# Patient Record
Sex: MALE | Race: OTHER | Employment: OTHER | URBAN - METROPOLITAN AREA
[De-identification: names, ages, dates, MRNs, and addresses within clinical notes are randomized per-mention and may not be internally consistent; named-entity substitution may affect disease eponyms.]

---

## 2017-09-19 ENCOUNTER — OFFICE VISIT (OUTPATIENT)
Dept: ORTHOPEDIC SURGERY | Age: 82
End: 2017-09-19

## 2017-09-19 VITALS
RESPIRATION RATE: 16 BRPM | TEMPERATURE: 97.8 F | WEIGHT: 171.8 LBS | DIASTOLIC BLOOD PRESSURE: 47 MMHG | BODY MASS INDEX: 26.04 KG/M2 | SYSTOLIC BLOOD PRESSURE: 121 MMHG | HEART RATE: 81 BPM | OXYGEN SATURATION: 96 % | HEIGHT: 68 IN

## 2017-09-19 DIAGNOSIS — M79.2 NEURITIS: ICD-10-CM

## 2017-09-19 DIAGNOSIS — M47.816 LUMBAR FACET ARTHROPATHY: ICD-10-CM

## 2017-09-19 DIAGNOSIS — M54.50 LUMBAR SPINE PAIN: Primary | ICD-10-CM

## 2017-09-19 DIAGNOSIS — M51.36 DDD (DEGENERATIVE DISC DISEASE), LUMBAR: ICD-10-CM

## 2017-09-19 DIAGNOSIS — M62.830 MUSCLE SPASM OF BACK: ICD-10-CM

## 2017-09-19 NOTE — PATIENT INSTRUCTIONS
Low Back Arthritis: Exercises  Your Care Instructions  Here are some examples of typical rehabilitation exercises for your condition. Start each exercise slowly. Ease off the exercise if you start to have pain. Your doctor or physical therapist will tell you when you can start these exercises and which ones will work best for you. When you are not being active, find a comfortable position for rest. Some people are comfortable on the floor or a medium-firm bed with a small pillow under their head and another under their knees. Some people prefer to lie on their side with a pillow between their knees. Don't stay in one position for too long. Take short walks (10 to 20 minutes) every 2 to 3 hours. Avoid slopes, hills, and stairs until you feel better. Walk only distances you can manage without pain, especially leg pain. How to do the exercises  Pelvic tilt    1. Lie on your back with your knees bent. 2. \"Brace\" your stomach--tighten your muscles by pulling in and imagining your belly button moving toward your spine. 3. Press your lower back into the floor. You should feel your hips and pelvis rock back. 4. Hold for 6 seconds while breathing smoothly. 5. Relax and allow your pelvis and hips to rock forward. 6. Repeat 8 to 12 times. Back stretches    1. Get down on your hands and knees on the floor. 2. Relax your head and allow it to droop. Round your back up toward the ceiling until you feel a nice stretch in your upper, middle, and lower back. Hold this stretch for as long as it feels comfortable, or about 15 to 30 seconds. 3. Return to the starting position with a flat back while you are on your hands and knees. 4. Let your back sway by pressing your stomach toward the floor. Lift your buttocks toward the ceiling. 5. Hold this position for 15 to 30 seconds. 6. Repeat 2 to 4 times. Follow-up care is a key part of your treatment and safety.  Be sure to make and go to all appointments, and call your doctor if you are having problems. It's also a good idea to know your test results and keep a list of the medicines you take. Where can you learn more? Go to http://kimberli-tara.info/. Enter S015 in the search box to learn more about \"Low Back Arthritis: Exercises. \"  Current as of: March 21, 2017  Content Version: 11.3  © 0418-4290 [employer]. Care instructions adapted under license by [employer] (which disclaims liability or warranty for this information). If you have questions about a medical condition or this instruction, always ask your healthcare professional. [place] [address] any warranty or liability for your use of this information. Learning About Medial Branch Block and Neurotomy  What are medial branch block and neurotomy? Facet joints connect your vertebrae to each other. Problems in these joints can cause chronic (long-term) pain in the neck or back. They can sometimes affect the shoulders, arms, buttocks, or legs. Medial branch nerves are the nerves that carry many of the pain messages from your facet joints. Radiofrequency medial branch neurotomy is a type of medial branch neurotomy that is used to relieve arthritis pain. It uses radio waves to damage nerves in your neck or back so that they can no longer send pain messages to your brain. Before your doctor knows if a neurotomy will help you, he or she will do a medial branch block to find out if certain nerves are the ones that are a source of your pain. You will need two separate visits to the outpatient center or hospital to have both procedures. How is a medial branch block done? The doctor will use a tiny needle to numb the skin where you will get the block. Then he or she puts the block needle into the numbed area. You may feel some pressure, but you should not feel pain.  Using fluoroscopy (live X-ray) to guide the needle, the doctor injects medicine onto one or more nerves to make them numb. If you get relief from your pain in the next 4 to 6 hours, it's a sign that those nerves may be contributing to your pain. The relief will last only a short time. You may then have a medial branch neurotomy at a later visit to try to get longer relief. It takes 20 to 30 minutes to get the block. You can go home after the doctor watches you for about an hour. You will get instructions on how to report how much pain you have when you are at home. You will need someone to drive you home. How is medial branch neurotomy done? The doctor will use a tiny needle to numb the skin where you will get the neurotomy. Then he or she puts the neurotomy needle into the numbed area. You may feel some pressure. Using fluoroscopy (live X-ray) to guide the needle, the doctor sends radio waves through the needle to the nerve for 60 to 90 seconds. The radio waves heat the nerve, which damages it. The doctor may do this several times. And he or she may treat more than one nerve. It takes 45 to 90 minutes to get a neurotomy, depending on how many nerves are heated. You will probably go home 30 to 60 minutes later. You will need someone to drive you home. What can you expect after a neurotomy? You may feel a little sore or tender at the injection site at first. But after a successful neurotomy, most people have pain relief right away. It often lasts for 9 to 12 months or longer. Sometimes the pain relief is permanent. If your pain does come back, it may mean that the damaged nerve has healed and can send pain messages again. Or it can mean that a different nerve is causing pain. Your doctor will discuss your options with you. Follow-up care is a key part of your treatment and safety. Be sure to make and go to all appointments, and call your doctor if you are having problems. It's also a good idea to know your test results and keep a list of the medicines you take. Where can you learn more?   Go to http://tami.info/. Enter Q250 in the search box to learn more about \"Learning About Medial Branch Block and Neurotomy. \"  Current as of: October 14, 2016  Content Version: 11.3  © 9217-9823 [employer]. Care instructions adapted under license by [employer] (which disclaims liability or warranty for this information). If you have questions about a medical condition or this instruction, always ask your healthcare professional. [address] any warranty or liability for your use of this information.

## 2017-09-19 NOTE — PROGRESS NOTES
[facility]  [address], [facility] [address]  Phone: [phone]  Fax: [phone]    NEW PATIENT    ASSESSMENT   Diagnoses and all orders for this visit:    1. Lumbar spine pain  -     [79982] LS Spine 4V  -     MRI LUMB SPINE W WO CONT; Future    2. Lumbar facet arthropathy  -     MRI LUMB SPINE W WO CONT; Future  -     REFERRAL TO PAIN MANAGEMENT    3. Muscle spasm of back  -     MRI LUMB SPINE W WO CONT; Future  -     REFERRAL TO PAIN MANAGEMENT    4. DDD (degenerative disc disease), lumbar  -     MRI LUMB SPINE W WO CONT; Future    5. Neuritis  -     MRI LUMB SPINE W WO CONT; Future       IMPRESSION AND PLAN:  Meño Schuler is a 81 y.o. male with history of lumbar pain with left radicular symptoms. Pt c/o intermittent pain in the left hip/buttock that extends down the left leg. He has tried steroid injections in the past with temporary relief. 1) Pt was given information on lumbar arthritis exercises. 2) I encouraged the patient to exercise regularly, 30 minutes a day, 5 days a week. 3) Discussed treatment options with the Pt, including steroid injection and a lumbar RFA. 4) Pt was given information on radiofrequency ablation. 5) An open lumbar MRI was ordered. 6) I recommended the Pt try andrea chi, water exercise, and chair yoga. 7) Mr. Alfonso Tamayo has a reminder for a \"due or due soon\" health maintenance. I have asked that he contact his primary care provider, Pushpa Mcclure DO, for follow-up on this health maintenance. 8)  demonstrated consistency with prescribing. 9) Pt will follow-up with Dr. Griselda Akins. HISTORY OF PRESENT ILLNESS:  Meño Schuler is a 81 y.o. male with history of lumbar pain with left radicular symptoms. Pt c/o intermittent pain in the left hip/buttock that extends down the left leg.  His pain is worse when bending/ working in the yard and better with rest. Pt reports that he experienced severe left lower back pain about 1 month ago and was unable to get out of bed that day. He states that his pain gradually improved and denies experience any pain radiating down the left leg at that time. Pt admits that he experienced left side sciatica in the past that improved for years but reports that the pain has recently returned. Of note, Pt reports a prior lumbar surgery at L4-5. He has tried a L4-5 facet injection in the past with temporary relief. Pt at this time desires to proceed with a lumbar MRI and a referral to Dr. Tamie Hernandez for lumbar RFA evaluation. Of note, the patient reports a prior right knee replacement. He likes to walk for 30 minutes on the treadmill and then uses the stationary bicycle for 30 minutes at the [facility] 3-5 days a week. Pt denies any recent falls but admits that he occasionally does not feel as stable on his feet. Pain Scale: 8/10     PCP: Autry Mcardle, DO      Past Medical History:   Diagnosis Date    CAD (coronary artery disease)     5 stents    Chronic back pain     Diabetes ([facility] [address]     Hypercholesterolemia     Hypertension         Social History     Social History    Marital status:      Spouse name: N/A    Number of children: N/A    Years of education: N/A     Occupational History    Not on file. Social History Main Topics    Smoking status: Not on file    Smokeless tobacco: Not on file    Alcohol use Yes      Comment: occasionally    Drug use: Not on file    Sexual activity: Not on file     Other Topics Concern    Not on file     Social History Narrative       Current Outpatient Prescriptions   Medication Sig Dispense Refill    PITAVASTATIN CALCIUM (LIVALO PO) Take  by mouth.  RANITIDINE HCL PO Take  by mouth.  GABAPENTIN (NEURONTIN PO) Take  by mouth.  omega-3 fatty acids-vitamin e (FISH OIL) 1,000 mg cap Take 1 Cap by mouth.  Cholecalciferol, Vitamin D3, (VITAMIN D3) 1,000 unit cap Take  by mouth.       METFORMIN HCL (METFORMIN PO) Take by mouth.  LISINOPRIL PO Take  by mouth.  CETIRIZINE HCL (CETIRIZINE PO) Take  by mouth.  CLOPIDOGREL BISULFATE (PLAVIX PO) Take  by mouth.  ATENOLOL PO Take  by mouth.  ACETAMINOPHEN WITH CODEINE (TYLENOL-CODEINE #3 PO) Take  by mouth.  ROSUVASTATIN CALCIUM (CRESTOR PO) Take  by mouth. Allergies   Allergen Reactions    Percocet [Oxycodone-Acetaminophen] Unknown (comments)    Vicodin [Hydrocodone-Acetaminophen] Other (comments)     hallucinates       REVIEW OF SYSTEMS    Constitutional: Negative for fever, chills, or weight change. Respiratory: Negative for cough or shortness of breath. Cardiovascular: Negative for chest pain or palpitations. Gastrointestinal: Negative for acid reflux, change in bowel habits, or constipation. Genitourinary: Negative for dysuria and flank pain. Musculoskeletal: Positive for lumbar pain. Skin: Negative for rash. Neurological: Negative for headaches, dizziness, or numbness. Endo/Heme/Allergies: Negative for increased bruising. Psychiatric/Behavioral: Negative for difficulty with sleep. PHYSICAL EXAMINATION  Visit Vitals    /47    Pulse 81    Temp 97.8 °F (36.6 °C) (Oral)    Resp 16    Ht 5' 7.5\" (1.715 m)    Wt 171 lb 12.8 oz (77.9 kg)    SpO2 96%    BMI 26.51 kg/m2       Constitutional: Awake, alert, and in no acute distress  HEENT: Normocephalic. Atraumatic. Oropharynx is moist and clear. PERRL. EOMI. Sclerae are nonicteric  Heart: Regular rate and rhythm  Lungs: Clear to auscultation bilaterally  Abdomen: Soft and nontender. Bowel sounds are present  Neurological: 1+ symmetrical DTRs in the upper extremities. 1+ symmetrical DTRs in the lower extremities. Sensation to light touch is intact. Negative Sarita's sign bilaterally. Skin: warm, dry, and intact. Musculoskeletal: Good ROM in the cervical spine in all planes. Minimal tightness across the upper trapezius.  Tenderness to palpation in the lower lumbar region. Moderate pain with extension, axial loading, and forward flexion. Very limited internal rotation of the left hip without pain. Normal ROM with internal or external rotation of his right hip. Negative straight leg raise bilaterally. Biceps  Triceps Deltoids Wrist Ext Wrist Flex Hand Intrin   Right +4/5 +4/5 +4/5 +4/5 +4/5 +4/5   Left +4/5 +4/5 +4/5 +4/5 +4/5 +4/5      Hip Flex  Quads Hamstrings Ankle DF EHL Ankle PF   Right +4/5 +4/5 +4/5 +4/5 +4/5 +4/5   Left +4/5 +4/5 +4/5 +4/5 +4/5 +4/5     IMAGING:    Lumbar spine 4V X-rays from 09/19/2017 were personally reviewed with the patient and demonstrated:  1) Scoliosis. 2) Multilevel bridging osteophytes with a large L1-2 bridging osteophyte on the right. 3) Diffuse degenerative changes. 4) Calcification in the aorta. 5) Degenerative joint findings in both hips. Written by Marina Culver, as dictated by Edel Craven MD.  I, Dr. Edel Craven confirm that all documentation is accurate.

## 2017-09-19 NOTE — MR AVS SNAPSHOT
Visit Information Date & Time Provider Department Dept. Phone Encounter #  
 9/19/2017  9:00 AM Los Moss, [address] [facility]  Follow-up Instructions Return if symptoms worsen or fail to improve, for Diagnostic Test follow up. Upcoming Health Maintenance Date Due DTaP/Tdap/Td series (1 - Tdap) 1/23/1957 ZOSTER VACCINE AGE 60> 11/23/1995 GLAUCOMA SCREENING Q2Y 1/23/2001 Pneumococcal 65+ Low/Medium Risk (1 of 2 - PCV13) 1/23/2001 MEDICARE YEARLY EXAM 1/23/2001 INFLUENZA AGE 9 TO ADULT 8/1/2017 Allergies as of 9/19/2017  Review Complete On: 9/19/2017 By: Los Moss MD  
  
 Severity Noted Reaction Type Reactions Percocet [Oxycodone-acetaminophen]  07/29/2013    Unknown (comments) Vicodin [Hydrocodone-acetaminophen]   Intolerance Other (comments)  
 hallucinates Current Immunizations  Never Reviewed No immunizations on file. Not reviewed this visit You Were Diagnosed With   
  
 Codes Comments Lumbar spine pain    -  Primary ICD-10-CM: M54.5 ICD-9-CM: 724.2 Lumbar facet arthropathy     ICD-10-CM: M12.88 ICD-9-CM: 721.3 Muscle spasm of back     ICD-10-CM: M51.665 ICD-9-CM: 724.8   
 DDD (degenerative disc disease), lumbar     ICD-10-CM: M51.36 
ICD-9-CM: 722.52 Neuritis     ICD-10-CM: M79.2 ICD-9-CM: 729.2 Vitals BP Pulse Temp Resp Height(growth percentile) Weight(growth percentile) 121/47 81 97.8 °F (36.6 °C) (Oral) 16 5' 7.5\" (1.715 m) 171 lb 12.8 oz (77.9 kg) SpO2 BMI Smoking Status 96% 26.51 kg/m2 Never Assessed BMI and BSA Data Body Mass Index Body Surface Area  
 26.51 kg/m 2 1.93 m 2 Preferred Pharmacy Pharmacy Name Phone [facility] [address], [address] [facility] Your Updated Medication List  
  
   
 This list is accurate as of: 9/19/17 10:22 AM.  Always use your most recent med list.  
  
  
  
  
 ATENOLOL PO Take  by mouth. CETIRIZINE PO Take  by mouth. CRESTOR PO Take  by mouth. FISH OIL 1,000 mg Cap Generic drug:  omega-3 fatty acids-vitamin e Take 1 Cap by mouth. LISINOPRIL PO Take  by mouth. LIVALO PO Take  by mouth. METFORMIN PO Take  by mouth. NEURONTIN PO Take  by mouth. PLAVIX PO Take  by mouth. RANITIDINE HCL PO Take  by mouth. TYLENOL-CODEINE #3 PO Take  by mouth. VITAMIN D3 1,000 unit Cap Generic drug:  cholecalciferol Take  by mouth. We Performed the Following AMB POC XRAY, SPINE, LUMBOSACRAL; 4+ [id number] CPT(R)] REFERRAL TO PAIN MANAGEMENT [VVB882 Custom] Comments:  
 Refer for RFA Lumbar Follow-up Instructions Return if symptoms worsen or fail to improve, for Diagnostic Test follow up. To-Do List   
 09/25/2017 5:00 PM  
  Appointment with [facility] RM 2 at [address] [place] [address] ([phone]) GENERAL INSTRUCTIONS  Bring information (ID card) if you have any medically implanted devices. You will be required to lie still while the procedure is being performed. Remove any jewelry (including body piercing, hairpins) prior to MRI. If you have had a creatinine level drawn within the past 30 days, please bring most recent results to your appt. Bring any films, CD's, and reports related to your study with you on the day of your exam.  This only includes studies done outside of [address] [place] [address], [place] , [facility], and [facility]. Bring a complete list of all medications you are currently taking to include prescriptions, over-the-counter meds, herbals, vitamins & any dietary supplements. If you were given medications for claustrophobia or anxiety, please arrange to have someone drive you to your appointment.   QUESTIONS Notify the MRI Department if you have any questions concerning your study. Hugo Vyas [address] [address]  
  
 09/26/2017 Imaging:  MRI LUMB SPINE W WO CONT Referral Information Referral ID Referred By Referred To  
  
 [id number] Kayla Avery Not Available Visits Status Start Date End Date 1 New Request 9/19/17 9/19/18 If your referral has a status of pending review or denied, additional information will be sent to support the outcome of this decision. Patient Instructions Low Back Arthritis: Exercises Your Care Instructions Here are some examples of typical rehabilitation exercises for your condition. Start each exercise slowly. Ease off the exercise if you start to have pain. Your doctor or physical therapist will tell you when you can start these exercises and which ones will work best for you. When you are not being active, find a comfortable position for rest. Some people are comfortable on the floor or a medium-firm bed with a small pillow under their head and another under their knees. Some people prefer to lie on their side with a pillow between their knees. Don't stay in one position for too long. Take short walks (10 to 20 minutes) every 2 to 3 hours. Avoid slopes, hills, and stairs until you feel better. Walk only distances you can manage without pain, especially leg pain. How to do the exercises Pelvic tilt 1. Lie on your back with your knees bent. 2. \"Brace\" your stomachtighten your muscles by pulling in and imagining your belly button moving toward your spine. 3. Press your lower back into the floor. You should feel your hips and pelvis rock back. 4. Hold for 6 seconds while breathing smoothly. 5. Relax and allow your pelvis and hips to rock forward. 6. Repeat 8 to 12 times. Back stretches 1. Get down on your hands and knees on the floor. 2. Relax your head and allow it to droop. Round your back up toward the ceiling until you feel a nice stretch in your upper, middle, and lower back. Hold this stretch for as long as it feels comfortable, or about 15 to 30 seconds. 3. Return to the starting position with a flat back while you are on your hands and knees. 4. Let your back sway by pressing your stomach toward the floor. Lift your buttocks toward the ceiling. 5. Hold this position for 15 to 30 seconds. 6. Repeat 2 to 4 times. Follow-up care is a key part of your treatment and safety. Be sure to make and go to all appointments, and call your doctor if you are having problems. It's also a good idea to know your test results and keep a list of the medicines you take. Where can you learn more? Go to http://kimberli-tara.info/. Enter Z082 in the search box to learn more about \"Low Back Arthritis: Exercises. \" Current as of: March 21, 2017 Content Version: 11.3 © 0407-4970 [employer]. Care instructions adapted under license by [employer] (which disclaims liability or warranty for this information). If you have questions about a medical condition or this instruction, always ask your healthcare professional. [address] any warranty or liability for your use of this information. Learning About Medial Branch Block and Neurotomy What are medial branch block and neurotomy? Facet joints connect your vertebrae to each other. Problems in these joints can cause chronic (long-term) pain in the neck or back. They can sometimes affect the shoulders, arms, buttocks, or legs. Medial branch nerves are the nerves that carry many of the pain messages from your facet joints. Radiofrequency medial branch neurotomy is a type of medial branch neurotomy that is used to relieve arthritis pain.  It uses radio waves to damage nerves in your neck or back so that they can no longer send pain messages to your brain. Before your doctor knows if a neurotomy will help you, he or she will do a medial branch block to find out if certain nerves are the ones that are a source of your pain. You will need two separate visits to the outpatient center or hospital to have both procedures. How is a medial branch block done? The doctor will use a tiny needle to numb the skin where you will get the block. Then he or she puts the block needle into the numbed area. You may feel some pressure, but you should not feel pain. Using fluoroscopy (live X-ray) to guide the needle, the doctor injects medicine onto one or more nerves to make them numb. If you get relief from your pain in the next 4 to 6 hours, it's a sign that those nerves may be contributing to your pain. The relief will last only a short time. You may then have a medial branch neurotomy at a later visit to try to get longer relief. It takes 20 to 30 minutes to get the block. You can go home after the doctor watches you for about an hour. You will get instructions on how to report how much pain you have when you are at home. You will need someone to drive you home. How is medial branch neurotomy done? The doctor will use a tiny needle to numb the skin where you will get the neurotomy. Then he or she puts the neurotomy needle into the numbed area. You may feel some pressure. Using fluoroscopy (live X-ray) to guide the needle, the doctor sends radio waves through the needle to the nerve for 60 to 90 seconds. The radio waves heat the nerve, which damages it. The doctor may do this several times. And he or she may treat more than one nerve. It takes 45 to 90 minutes to get a neurotomy, depending on how many nerves are heated. You will probably go home 30 to 60 minutes later. You will need someone to drive you home. What can you expect after a neurotomy?  
You may feel a little sore or tender at the injection site at first. But after a successful neurotomy, most people have pain relief right away. It often lasts for 9 to 12 months or longer. Sometimes the pain relief is permanent. If your pain does come back, it may mean that the damaged nerve has healed and can send pain messages again. Or it can mean that a different nerve is causing pain. Your doctor will discuss your options with you. Follow-up care is a key part of your treatment and safety. Be sure to make and go to all appointments, and call your doctor if you are having problems. It's also a good idea to know your test results and keep a list of the medicines you take. Where can you learn more? Go to http://kimberli-tara.info/. Enter P278 in the search box to learn more about \"Learning About Medial Branch Block and Neurotomy. \" Current as of: October 14, 2016 Content Version: 11.3 © 3694-3278 [employer]. Care instructions adapted under license by [employer] (which disclaims liability or warranty for this information). If you have questions about a medical condition or this instruction, always ask your healthcare professional. [address] any warranty or liability for your use of this information. Introducing [facility]! Vikram Rodriguez introduces OnHand patient portal. Now you can access parts of your medical record, email your doctor's office, and request medication refills online. 1. In your internet browser, go to [email or web address] 2. Click on the First Time User? Click Here link in the Sign In box. You will see the New Member Sign Up page. 3. Enter your OnHand Access Code exactly as it appears below. You will not need to use this code after youve completed the sign-up process. If you do not sign up before the expiration date, you must request a new code. · OnHand Access Code: [email or web address] Expires: 12/18/2017 10:11 AM 
 
 4. Enter the last four digits of your Social Security Number (xxxx) and Date of Birth (mm/dd/yyyy) as indicated and click Submit. You will be taken to the next sign-up page. 5. Create a Fyreball ID. This will be your Fyreball login ID and cannot be changed, so think of one that is secure and easy to remember. 6. Create a Fyreball password. You can change your password at any time. 7. Enter your Password Reset Question and Answer. This can be used at a later time if you forget your password. 8. Enter your e-mail address. You will receive e-mail notification when new information is available in [address] 9. Click Sign Up. You can now view and download portions of your medical record. 10. Click the Download Summary menu link to download a portable copy of your medical information. If you have questions, please visit the Frequently Asked Questions section of the Fyreball website. Remember, Fyreball is NOT to be used for urgent needs. For medical emergencies, dial 911. Now available from your iPhone and Android! Please provide this summary of care documentation to your next provider. Your primary care clinician is listed as Keyon Parra. If you have any questions after today's visit, please call [phone].

## 2017-09-25 ENCOUNTER — HOSPITAL ENCOUNTER (OUTPATIENT)
Age: 82
Discharge: HOME | End: 2017-09-25
Attending: PHYSICAL MEDICINE & REHABILITATION
Payer: MEDICARE

## 2017-09-25 DIAGNOSIS — M79.2 NEURITIS: ICD-10-CM

## 2017-09-25 DIAGNOSIS — M47.816 LUMBAR FACET ARTHROPATHY: ICD-10-CM

## 2017-09-25 DIAGNOSIS — M62.830 MUSCLE SPASM OF BACK: ICD-10-CM

## 2017-09-25 DIAGNOSIS — M54.50 LUMBAR SPINE PAIN: ICD-10-CM

## 2017-09-25 DIAGNOSIS — M51.36 DDD (DEGENERATIVE DISC DISEASE), LUMBAR: ICD-10-CM

## 2017-09-25 PROCEDURE — 74011250636 HC RX REV CODE- 250/636: Performed by: PHYSICAL MEDICINE & REHABILITATION

## 2017-09-25 PROCEDURE — 72158 MRI LUMBAR SPINE W/O & W/DYE: CPT

## 2017-09-25 PROCEDURE — A9585 GADOBUTROL INJECTION: HCPCS | Performed by: PHYSICAL MEDICINE & REHABILITATION

## 2017-09-25 PROCEDURE — 82565 ASSAY OF CREATININE: CPT

## 2017-09-25 RX ADMIN — GADOBUTROL 7.5 ML: 604.72 INJECTION INTRAVENOUS at 18:00

## 2017-09-27 ENCOUNTER — OFFICE VISIT (OUTPATIENT)
Dept: ORTHOPEDIC SURGERY | Age: 82
End: 2017-09-27

## 2017-09-27 VITALS
HEIGHT: 67 IN | DIASTOLIC BLOOD PRESSURE: 51 MMHG | HEART RATE: 55 BPM | RESPIRATION RATE: 16 BRPM | WEIGHT: 171 LBS | SYSTOLIC BLOOD PRESSURE: 113 MMHG | BODY MASS INDEX: 26.84 KG/M2

## 2017-09-27 DIAGNOSIS — M47.816 LUMBAR FACET ARTHROPATHY: Primary | ICD-10-CM

## 2017-09-27 DIAGNOSIS — M62.830 MUSCLE SPASM OF BACK: ICD-10-CM

## 2017-09-27 DIAGNOSIS — M48.061 LUMBAR SPINAL STENOSIS: ICD-10-CM

## 2017-09-27 NOTE — PROGRESS NOTES
[facility]  [address], [facility] [address]  Phone: [phone]  Fax: [phone]      ASSESSMENT   Diagnoses and all orders for this visit:    1. Lumbar facet arthropathy    2. Muscle spasm of back    3. Lumbar spinal stenosis         IMPRESSION AND PLAN:  Jason Whitten is a 81 y.o. male with history of lumbar pain with left radicular symptoms. He had prior lumbar surgery with Dr. Krista Munoz 13 years ago. 1) Pt was given information on lumbar arthritis exercises. 2) Discussed treatment options with the Pt, including steroid injections and a RFA. 3) Pt was given information on radiofrequency ablation. 4) Pt was referred to Dr. Ovidio Strauss for lumbar RFA evaluation. 5) Mr. Madhu Laboy has a reminder for a \"due or due soon\" health maintenance. I have asked that he contact his primary care provider, Keaton Mccarty DO, for follow-up on this health maintenance. 6)  demonstrated consistency with prescribing. 7) Pt will follow-up as needed. HISTORY OF PRESENT ILLNESS:  Jason Whitten is a 81 y.o. male with history of lumbar pain with left radicular symptoms. He presents to the office today for lumbar MRI follow up. Pt c/o pain across the lower back that occasionally radiates down the left leg. He had prior lumbar surgery with Dr. Krista Munoz 13 years ago. Pt at this time desires to proceed with a referral to Dr. Ovidio Strauss for lumbar RFA evaluation. Pain Scale: /10    PCP: Keaton Mccarty DO       Past Medical History:   Diagnosis Date    CAD (coronary artery disease)     5 stents    Chronic back pain     Diabetes ([facility] [address]     Hypercholesterolemia     Hypertension         Social History     Social History    Marital status:      Spouse name: N/A    Number of children: N/A    Years of education: N/A     Occupational History    Not on file.      Social History Main Topics    Smoking status: Not on file    Smokeless tobacco: Not on file  Alcohol use Yes      Comment: occasionally    Drug use: Not on file    Sexual activity: Not on file     Other Topics Concern    Not on file     Social History Narrative       Current Outpatient Prescriptions   Medication Sig Dispense Refill    pitavastatin calcium (LIVALO) 2 mg tablet Take  by mouth daily.  PITAVASTATIN CALCIUM (LIVALO PO) Take  by mouth.  RANITIDINE HCL PO Take  by mouth.  omega-3 fatty acids-vitamin e (FISH OIL) 1,000 mg cap Take 1 Cap by mouth.  Cholecalciferol, Vitamin D3, (VITAMIN D3) 1,000 unit cap Take  by mouth.  METFORMIN HCL (METFORMIN PO) Take  by mouth.  LISINOPRIL PO Take  by mouth.  CETIRIZINE HCL (CETIRIZINE PO) Take  by mouth.  CLOPIDOGREL BISULFATE (PLAVIX PO) Take  by mouth.  ATENOLOL PO Take  by mouth.  GABAPENTIN (NEURONTIN PO) Take  by mouth.  ACETAMINOPHEN WITH CODEINE (TYLENOL-CODEINE #3 PO) Take  by mouth.  ROSUVASTATIN CALCIUM (CRESTOR PO) Take  by mouth. Allergies   Allergen Reactions    Percocet [Oxycodone-Acetaminophen] Unknown (comments)    Vicodin [Hydrocodone-Acetaminophen] Other (comments)     hallucinates         REVIEW OF SYSTEMS    Constitutional: Negative for fever, chills, or weight change. Respiratory: Negative for cough or shortness of breath. Cardiovascular: Negative for chest pain or palpitations. Gastrointestinal: Negative for acid reflux, change in bowel habits, or constipation. Genitourinary: Negative for dysuria and flank pain. Musculoskeletal: Positive for lumbar pain. Skin: Negative for rash. Neurological: Negative for headaches, dizziness, or numbness. Endo/Heme/Allergies: Negative for increased bruising. Psychiatric/Behavioral: Negative for difficulty with sleep.       PHYSICAL EXAMINATION  Visit Vitals    /51    Pulse (!) 55    Resp 16    Ht 5' 7\" (1.702 m)    Wt 171 lb (77.6 kg)    BMI 26.78 kg/m2       Constitutional: Awake, alert, and in no acute distress  Neurological: 1+ symmetrical DTRs in the lower extremities. Sensation to light touch is intact. Skin: warm, dry, and intact. Musculoskeletal: Tenderness to palpation in the lower lumbar region. Moderate pain with extension and axial loading. No pain with internal or external rotation of his hips. Negative straight leg raise bilaterally. Hip Flex  Quads Hamstrings Ankle DF EHL Ankle PF   Right +4/5 +4/5 +4/5 +4/5 +4/5 +4/5   Left +4/5 +4/5 +4/5 +4/5 +4/5 +4/5     IMAGING:    Lumbar spine MRI from 09/25/2017 was personally reviewed with the patient and demonstrated:    Results from [place] encounter on 09/25/17   MRI LUMB SPINE W WO CONT   Narrative EXAM:  MRI LUMB SPINE W WO CONT    INDICATION:   increase lumbar pain    COMPARISON: None    TECHNIQUE:   MR imaging of the lumbar spine was performed with sagittal T1, T2, STIR;  axial  T1, T2. Patient received 7.5 Gadavist intravenously. Postcontrast axial and  sagittal T1 MR images obtained. FINDINGS:  Leftward curvature of the lumbar spine apex left at L2/3. Transitional appearing  lumbosacral presumably partially lumbarized S1 vertebra with rudimentary S1/S2  disc. Vertebral body heights are maintained. Multilevel disc height loss with endplate  osteophytosis and degenerative changes. Multilevel associated mild discogenic  reactive bone marrow changes. No suspicious bone marrow lesion or infiltrative  bone marrow process. The conus medullaris is normal in signal intensity  terminating at T12/L1 level. Correlation of sagittal and axial images demonstrates the following:    T12/L1:  The spinal canal and neuroforamina are widely patent. L1/2:  Mild disc height loss. Circumferential disc bulge with osteophytic  ridging. No central canal stenosis or foraminal stenosis. L2/3:  Mild disc height loss. Circumferential disc bulge with osteophytic  ridging more towards the right. No central canal stenosis.  Mild bilateral  foraminal stenosis. Most likely mass effect on the extraforaminal right L2 nerve  root by large disc osteophyte. L3/4:  Disc height loss with circumferential disc bulge and osteophytic ridging. Mild facet hypertrophy. No central canal stenosis. Severe left and moderate  right foraminal stenosis. L4/5:  Disc height loss with disc bulge and osteophytic ridging severe left and  mild right facet hypertrophy. Ligamentum flavum hypertrophy. Mild central  stenosis. Mild/moderate right and moderate/severe left foraminal stenosis. L5/S1:  Disc height loss with disc bulge and osteophytic ridging. Severe left  and mild right facet hypertrophy. No central stenosis. Mild/moderate right and  moderate/severe left foraminal stenosis. S1/S2: Rudimentary unremarkable disc. Patent canal and foramina. No incidental abnormality in the partially imaged ventral tibial structures. Impression IMPRESSION:      1. Multilevel moderate to advanced degenerative disc and facet joint disease  with scoliosis.  -No high-grade central canal stenosis. -Multilevel high-grade foraminal stenosis; left L3/4, L4/5 and L5/S1 neural  foramina. 2. Note is made of a transitional S1 vertebra with rudimentary S1/S2 disc. Accurate numbering should be confirmed prior to any intervention. Written by Melissa Tarango, as dictated by Man Sepulveda MD.  I, Dr. Man Sepulveda confirm that all documentation is accurate.

## 2017-09-27 NOTE — PATIENT INSTRUCTIONS
[employer] Activation    Thank you for requesting access to [employer]. Please follow the instructions below to securely access and download your online medical record. [employer] allows you to send messages to your doctor, view your test results, renew your prescriptions, schedule appointments, and more. How Do I Sign Up? 1. In your internet browser, go to [email or web address].[email or web address]  2. Click on the First Time User? Click Here link in the Sign In box. You will be redirect to the New Member Sign Up page. 3. Enter your [employer] Access Code exactly as it appears below. You will not need to use this code after youve completed the sign-up process. If you do not sign up before the expiration date, you must request a new code. [employer] Access Code: [email or web address]  Expires: 2017 10:11 AM (This is the date your [employer] access code will )    4. Enter the last four digits of your Social Security Number (xxxx) and Date of Birth (mm/dd/yyyy) as indicated and click Submit. You will be taken to the next sign-up page. 5. Create a [employer] ID. This will be your [employer] login ID and cannot be changed, so think of one that is secure and easy to remember. 6. Create a [employer] password. You can change your password at any time. 7. Enter your Password Reset Question and Answer. This can be used at a later time if you forget your password. 8. Enter your e-mail address. You will receive e-mail notification when new information is available in [address]. 9. Click Sign Up. You can now view and download portions of your medical record. 10. Click the Download Summary menu link to download a portable copy of your medical information. Additional Information    If you have questions, please visit the Frequently Asked Questions section of the [employer] website at [email or web address] Remember, [employer] is NOT to be used for urgent needs. For medical emergencies, dial 911.          Low Back Arthritis: Exercises  Your Care Instructions  Here are some examples of typical rehabilitation exercises for your condition. Start each exercise slowly. Ease off the exercise if you start to have pain. Your doctor or physical therapist will tell you when you can start these exercises and which ones will work best for you. When you are not being active, find a comfortable position for rest. Some people are comfortable on the floor or a medium-firm bed with a small pillow under their head and another under their knees. Some people prefer to lie on their side with a pillow between their knees. Don't stay in one position for too long. Take short walks (10 to 20 minutes) every 2 to 3 hours. Avoid slopes, hills, and stairs until you feel better. Walk only distances you can manage without pain, especially leg pain. How to do the exercises  Pelvic tilt    1. Lie on your back with your knees bent. 2. \"Brace\" your stomach--tighten your muscles by pulling in and imagining your belly button moving toward your spine. 3. Press your lower back into the floor. You should feel your hips and pelvis rock back. 4. Hold for 6 seconds while breathing smoothly. 5. Relax and allow your pelvis and hips to rock forward. 6. Repeat 8 to 12 times. Back stretches    1. Get down on your hands and knees on the floor. 2. Relax your head and allow it to droop. Round your back up toward the ceiling until you feel a nice stretch in your upper, middle, and lower back. Hold this stretch for as long as it feels comfortable, or about 15 to 30 seconds. 3. Return to the starting position with a flat back while you are on your hands and knees. 4. Let your back sway by pressing your stomach toward the floor. Lift your buttocks toward the ceiling. 5. Hold this position for 15 to 30 seconds. 6. Repeat 2 to 4 times. Follow-up care is a key part of your treatment and safety.  Be sure to make and go to all appointments, and call your doctor if you are having problems. It's also a good idea to know your test results and keep a list of the medicines you take. Where can you learn more? Go to http://kimberli-tara.info/. Enter B653 in the search box to learn more about \"Low Back Arthritis: Exercises. \"  Current as of: March 21, 2017  Content Version: 11.3  © 2819-4881 [employer]. Care instructions adapted under license by [employer] (which disclaims liability or warranty for this information). If you have questions about a medical condition or this instruction, always ask your healthcare professional. [place] [address] any warranty or liability for your use of this information. Learning About Medial Branch Block and Neurotomy  What are medial branch block and neurotomy? Facet joints connect your vertebrae to each other. Problems in these joints can cause chronic (long-term) pain in the neck or back. They can sometimes affect the shoulders, arms, buttocks, or legs. Medial branch nerves are the nerves that carry many of the pain messages from your facet joints. Radiofrequency medial branch neurotomy is a type of medial branch neurotomy that is used to relieve arthritis pain. It uses radio waves to damage nerves in your neck or back so that they can no longer send pain messages to your brain. Before your doctor knows if a neurotomy will help you, he or she will do a medial branch block to find out if certain nerves are the ones that are a source of your pain. You will need two separate visits to the outpatient center or hospital to have both procedures. How is a medial branch block done? The doctor will use a tiny needle to numb the skin where you will get the block. Then he or she puts the block needle into the numbed area. You may feel some pressure, but you should not feel pain.  Using fluoroscopy (live X-ray) to guide the needle, the doctor injects medicine onto one or more nerves to make them numb.  If you get relief from your pain in the next 4 to 6 hours, it's a sign that those nerves may be contributing to your pain. The relief will last only a short time. You may then have a medial branch neurotomy at a later visit to try to get longer relief. It takes 20 to 30 minutes to get the block. You can go home after the doctor watches you for about an hour. You will get instructions on how to report how much pain you have when you are at home. You will need someone to drive you home. How is medial branch neurotomy done? The doctor will use a tiny needle to numb the skin where you will get the neurotomy. Then he or she puts the neurotomy needle into the numbed area. You may feel some pressure. Using fluoroscopy (live X-ray) to guide the needle, the doctor sends radio waves through the needle to the nerve for 60 to 90 seconds. The radio waves heat the nerve, which damages it. The doctor may do this several times. And he or she may treat more than one nerve. It takes 45 to 90 minutes to get a neurotomy, depending on how many nerves are heated. You will probably go home 30 to 60 minutes later. You will need someone to drive you home. What can you expect after a neurotomy? You may feel a little sore or tender at the injection site at first. But after a successful neurotomy, most people have pain relief right away. It often lasts for 9 to 12 months or longer. Sometimes the pain relief is permanent. If your pain does come back, it may mean that the damaged nerve has healed and can send pain messages again. Or it can mean that a different nerve is causing pain. Your doctor will discuss your options with you. Follow-up care is a key part of your treatment and safety. Be sure to make and go to all appointments, and call your doctor if you are having problems. It's also a good idea to know your test results and keep a list of the medicines you take. Where can you learn more?   Go to http://kimberli-tara.info/. Enter L401 in the search box to learn more about \"Learning About Medial Branch Block and Neurotomy. \"  Current as of: October 14, 2016  Content Version: 11.3  © 2864-6411 [employer]. Care instructions adapted under license by [employer] (which disclaims liability or warranty for this information). If you have questions about a medical condition or this instruction, always ask your healthcare professional. [address] any warranty or liability for your use of this information.

## 2017-10-02 ENCOUNTER — TELEPHONE (OUTPATIENT)
Dept: ORTHOPEDIC SURGERY | Age: 82
End: 2017-10-02

## 2017-10-02 LAB — CREAT UR-MCNC: 1.1 MG/DL (ref 0.6–1.3)

## 2017-10-02 NOTE — TELEPHONE ENCOUNTER
Patient called requesting a call back from [place]. The line was disconnected before I could figure out the reason for the call. Please advise patient at [phone].

## 2017-10-03 ENCOUNTER — OFFICE VISIT (OUTPATIENT)
Dept: PAIN MANAGEMENT | Age: 82
End: 2017-10-03

## 2017-10-03 VITALS
TEMPERATURE: 97 F | HEIGHT: 67 IN | SYSTOLIC BLOOD PRESSURE: 116 MMHG | HEART RATE: 68 BPM | BODY MASS INDEX: 26.84 KG/M2 | DIASTOLIC BLOOD PRESSURE: 66 MMHG | WEIGHT: 171 LBS

## 2017-10-03 DIAGNOSIS — M51.36 LUMBAR DEGENERATIVE DISC DISEASE: ICD-10-CM

## 2017-10-03 DIAGNOSIS — G89.4 CHRONIC PAIN SYNDROME: ICD-10-CM

## 2017-10-03 DIAGNOSIS — M47.816 LUMBAR FACET ARTHROPATHY: ICD-10-CM

## 2017-10-03 DIAGNOSIS — M47.816 LUMBAR SPONDYLOSIS: Primary | ICD-10-CM

## 2017-10-03 NOTE — TELEPHONE ENCOUNTER
Called and spoke with Mrs. Shady Hayden, he had his appt today with [facility] and everything went well.

## 2017-10-03 NOTE — PROGRESS NOTES
[address] for Pain Management  Interventional Pain Management Consultation History & Physical    PATIENT NAME:  Ahmet Mean OF BIRTH:   1935    DATE OF SERVICE:   10/3/2017      CHIEF COMPLAINT:  Back Pain      REASON FOR VISIT:   Kim Ramos presents to the pain clinic today for initial evaluation and to consider interventional pain management options as indicated for the type and location of the pain the patient is presenting with. HISTORY OF PRESENT ILLNESS:   Patient presents for initial evaluation and consideration for interventional procedures as indicated. He is referred to us by Dr. Joaquin Irwin for evaluation and consideration for lumbar radiofrequency neurotomy procedures at bilateral L3-4, L4-5, L5-S1 levels. Patient endorses chronic low back pain of long-standing duration. He had lumbar decompression surgery, discectomy, approximately 12 years ago. He has had slow onset of insidious low back pain. He denies any antecedent trauma injury or accident. He endorses aching pain across his low back. His primary pain complaint today is chronic low back pain. He denies significant radicular symptoms. He does have rather nonspecific referred left leg symptoms at times, again this does not appear to be radicular in nature. Pain is increased working around the yard. Pain is increased with prolonged sitting and standing. Patient endorses lumbar facet loading maneuvers that increase lumbar paraspinal pain symptoms, including lumbar twisting and turning, flexion and extension of the lumbar spine. These increase lumbar and low back pain symptoms. He has had injections, steroid injections by Dr. Joaquin Irwin with little improvement of his pain. He has tried physical therapy for his low back pain at [facility]. This really did not improve his low back pain. He is tried medications that have only marginally helped.   He has had lumbar spine surgery, discectomy, years ago. He is on Plavix antiplatelet management for history coronary artery disease, 5 stents. This is managed by Dr. Manuel Mcelroy, [facility] cardiologist.  [phone]. He last saw his cardiologist Dr. Chapo Cervantes about 3 months ago, checkup was fine. By review of available medical records, progress note dated September 27, 2017 by Dr. Theresa Painting is reviewed. Lumbar facet arthropathy is noted. Lumbar spinal stenosis. Low back surgery 13 years ago. Chronic low back pain. History coronary artery disease, status post 5 stents. Diabetes high cholesterol hypertension. Lumbar MRI dated September 25, 2017 is reviewed. Multilevel degenerative disc disease with osteophytosis and degenerative changes are noted. Disc bulges with osteophyte changes essentially throughout the lumbar spine. Lumbar facet hypertrophy lumbar facet arthropathy L3-4, L4-5, L5-S1. No high-grade canal stenosis is noted. Foraminal narrowing is noted left L3-4, L4-5, L5-S1 neural foramina. ASSESSMENT/OPTIONS: as follows. We discussed options. Patient has signs and symptoms, as well as exam and imaging evidence suggestive of ongoing low back pain that is lumbar facet mediated, due to lumbar facet arthropathy and lumbar facet hypertrophy. We will plan lumbar radiofrequency neurotomy procedures at bilateral L3-4, L4-5, L5-S1 levels with IV conscious sedation. I have discussed the risks and benefits, indications, contraindications, and side effects of intended procedure with the patient. I have used skeleton spine model to describe and discuss the procedure with the patient. I have answered all questions relating to the procedure. Patient understands the nature of the procedure and wishes to proceed. Patient has no further questions.       We will obtain permission to stop the patient's Plavix for 7 days prior to these elective procedures, we will get this permission from Dr. Dorothy Young, the patient's cardiologist.  Once I have this information faxed back to us, we will go ahead and schedule these procedures. MRI Results (most recent):    Results from [place] encounter on 09/25/17   MRI LUMB SPINE W WO CONT   Narrative EXAM:  MRI LUMB SPINE W WO CONT    INDICATION:   increase lumbar pain    COMPARISON: None    TECHNIQUE:   MR imaging of the lumbar spine was performed with sagittal T1, T2, STIR;  axial  T1, T2. Patient received 7.5 Gadavist intravenously. Postcontrast axial and  sagittal T1 MR images obtained. FINDINGS:  Leftward curvature of the lumbar spine apex left at L2/3. Transitional appearing  lumbosacral presumably partially lumbarized S1 vertebra with rudimentary S1/S2  disc. Vertebral body heights are maintained. Multilevel disc height loss with endplate  osteophytosis and degenerative changes. Multilevel associated mild discogenic  reactive bone marrow changes. No suspicious bone marrow lesion or infiltrative  bone marrow process. The conus medullaris is normal in signal intensity  terminating at T12/L1 level. Correlation of sagittal and axial images demonstrates the following:    T12/L1:  The spinal canal and neuroforamina are widely patent. L1/2:  Mild disc height loss. Circumferential disc bulge with osteophytic  ridging. No central canal stenosis or foraminal stenosis. L2/3:  Mild disc height loss. Circumferential disc bulge with osteophytic  ridging more towards the right. No central canal stenosis. Mild bilateral  foraminal stenosis. Most likely mass effect on the extraforaminal right L2 nerve  root by large disc osteophyte. L3/4:  Disc height loss with circumferential disc bulge and osteophytic ridging. Mild facet hypertrophy. No central canal stenosis. Severe left and moderate  right foraminal stenosis.     L4/5:  Disc height loss with disc bulge and osteophytic ridging severe left and  mild right facet hypertrophy. Ligamentum flavum hypertrophy. Mild central  stenosis. Mild/moderate right and moderate/severe left foraminal stenosis. L5/S1:  Disc height loss with disc bulge and osteophytic ridging. Severe left  and mild right facet hypertrophy. No central stenosis. Mild/moderate right and  moderate/severe left foraminal stenosis. S1/S2: Rudimentary unremarkable disc. Patent canal and foramina. No incidental abnormality in the partially imaged ventral tibial structures. Impression IMPRESSION:      1. Multilevel moderate to advanced degenerative disc and facet joint disease  with scoliosis.  -No high-grade central canal stenosis. -Multilevel high-grade foraminal stenosis; left L3/4, L4/5 and L5/S1 neural  foramina. 2. Note is made of a transitional S1 vertebra with rudimentary S1/S2 disc. Accurate numbering should be confirmed prior to any intervention. PAST MEDICAL HISTORY:   The patient  has a past medical history of CAD (coronary artery disease); Chronic back pain; Diabetes ([address] Hypercholesterolemia; and Hypertension. PAST SURGICAL HISTORY:   The patient  has a past surgical history that includes back surgery and knee arthroscopy (2009). CURRENT MEDICATIONS:   The patient has a current medication list which includes the following prescription(s): pitavastatin calcium, pitavastatin calcium, ranitidine hcl, gabapentin, omega-3 fatty acids-vitamin e, cholecalciferol, acetaminophen with codeine, rosuvastatin calcium, metformin hcl, lisinopril, cetirizine hcl, clopidogrel bisulfate, and atenolol. ALLERGIES:     Allergies   Allergen Reactions    Percocet [Oxycodone-Acetaminophen] Unknown (comments)    Vicodin [Hydrocodone-Acetaminophen] Other (comments)     hallucinates       FAMILY HISTORY:   The patient family history includes Cancer in his father; Stroke in his mother. SOCIAL HISTORY:   The patient  reports that he has quit smoking.  He does not have any smokeless tobacco history on file. The patient  reports that he drinks alcohol. He also  has no drug history on file. REVIEW OF SYSTEMS:    The patient denies fever, chills, weight loss (Constitutional), rash, itching (Skin), tinnitus, congestion (HENT), blurred vision, photophobia (Eyes), palpitations, orthopnea (Cardiovascular), hemoptysis, wheezing (Respiratory), nausea, vomiting, diarrhea (Gastrointestinal), dysuria, hematuria, urgency (Genitourinary), bowel or bladder incontinence, loss of consciousness (Neurologic), suicidal or homicidal ideation or hallucinations (Psychiatric). Denies swelling, axillary or groin masses (Lymphatic). PHYSICAL EXAM:  VS:   Visit Vitals    /66    Pulse 68    Temp 97 °F (36.1 °C)    Ht 5' 7\" (1.702 m)    Wt 77.6 kg (171 lb)    BMI 26.78 kg/m2     General: Well-developed and well-nourished. Body habitus consistent with recorded height and weight and the calculated BMI. Apparent distress due to low back pain. Head: Normocephalic, atraumatic. Skin: Inspection of the skin reveals no rashes, lesions or infection. CV: Regular rate. No murmurs or rubs noted. No peripheral edema noted. Pulm: Respirations are even and unlabored. Extr: No clubbing, cyanosis, or edema noted. Musculoskeletal:  1. Cervical spine - Full ROM. No paraspinous tenderness at any level. There is no scoliosis, asymmetry, or musculoskeletal defect. 2. Thoracic spine - Full ROM. No paraspinous tenderness at any level. There is no scoliosis, asymmetry, or musculoskeletal defect. 3. Lumbar spine -decreased range of motion all axes . Paraspinous tenderness bilaterally . SI joints are nontender bilaterally. There is no scoliosis, asymmetry, or musculoskeletal defect. 4. Right upper extremity - Full ROM. 5/5 muscle strength in all muscle groups. No pain or tenderness in shoulder, elbow, wrist, or hand. 5. Left upper extremity - Full ROM. 5/5 muscle strength in all muscle groups.   No pain or tenderness in shoulder, elbow, wrist, or hand. 6. Right lower extremity - Full ROM. 5/5 muscle strength in all muscle groups. No pain, tenderness, or swelling in the hip, knee, ankle or foot. 7. Left lower extremity - Full ROM. 5/5 muscle strength in all muscle groups. No pain, tenderness, or swelling in the hip, knee, ankle or foot. Neurological:  1. Mental Status - Alert, awake and oriented. Speech is clear and appropriate. 2. Cranial Nerves - Extraocular muscles intact bilaterally. Cranial nerves II-XII grossly intact bilaterally. 3. Gait - antalgic   4. Reflexes - 2+ and symmetric throughout. 5. Sensation - Intact to light touch and pin prick. 6. Provocative Tests -  Straight leg raise negative bilaterally. Psychological:  1. Mood and affect - Appropriate. 2. Speech - Appropriate. 3. Though content - Appropriate. 4. Judgment - Appropriate. ASSESSMENT:      ICD-10-CM ICD-9-CM    1. Lumbar spondylosis M47.816 721.3    2. Lumbar facet arthropathy M12.88 721.3    3. Lumbar degenerative disc disease M51.36 722.52    4. Chronic pain syndrome G89.4 338.4            PLAN:    1.    I have thoroughly discussed the risks and benefits, indications, contraindications, and side effects of any and procedures that were mentioned at today's patient visit. I have used a skeleton model to explain all procedures, as well as to provide added emphasis regarding procedures and as well for patient education purposes. I have answered all questions in great detail, and I have obtained verbal confirmation for all procedures planned with the patient. 3.    I have reviewed in great detail today the patient's MRI and other imaging studies with the patient. I have explained to the patient their condition using both actual recent and relevant images insofar as I am able to obtain actual images. I have used a skeleton model for added emphasis as well as patient education.       4.    I have advised patient to have a primary care provider continue to care for their health maintenance and general medical conditions. 5,    I have placed appropriate referrals to specialty care providers as I have deemed necessary through today's clinical consultation with the patient. 5.    I have explained to the patient that if any significant side effects, issues, problems, concerns, or perceived complications may have arisen at around the time of the patient's procedures, they should either call the pain management clinic or go to the emergency room immediately for medical provider evaluation. 6.   I have encouraged all patients to call the pain management clinic with any questions or concerns that they may have pertaining to their procedures. DISPOSITION:   The patients condition and plan were discussed at length and all questions were answered. The patient agrees with the plan. A total of 45 minutes was spent with the patient of which over half of the time was spent counseling the patient. Hien Cabrera MD 10/4/2017 2:19 PM    Note: Although these clinic notes were documented by the provider at the time of the exam, they have not been proofed and are subject to transcription variance.

## 2017-10-04 PROBLEM — M47.816 LUMBAR SPONDYLOSIS: Status: ACTIVE | Noted: 2017-10-04

## 2017-10-04 PROBLEM — M51.36 LUMBAR DEGENERATIVE DISC DISEASE: Status: ACTIVE | Noted: 2017-10-04

## 2017-10-04 PROBLEM — M47.816 LUMBAR FACET ARTHROPATHY: Status: ACTIVE | Noted: 2017-10-04

## 2017-10-04 PROBLEM — G89.4 CHRONIC PAIN SYNDROME: Status: ACTIVE | Noted: 2017-10-04

## 2017-10-18 RX ORDER — SODIUM CHLORIDE 0.9 % (FLUSH) 0.9 %
5-10 SYRINGE (ML) INJECTION AS NEEDED
Status: CANCELLED | OUTPATIENT
Start: 2017-10-18

## 2017-10-18 RX ORDER — DIAZEPAM 5 MG/1
10 TABLET ORAL ONCE
Status: CANCELLED | OUTPATIENT
Start: 2017-10-23 | End: 2017-10-23

## 2017-10-23 ENCOUNTER — APPOINTMENT (OUTPATIENT)
Dept: GENERAL RADIOLOGY | Age: 82
End: 2017-10-23
Attending: PHYSICAL MEDICINE & REHABILITATION
Payer: MEDICARE

## 2017-10-23 ENCOUNTER — HOSPITAL ENCOUNTER (OUTPATIENT)
Age: 82
Setting detail: OUTPATIENT SURGERY
Discharge: HOME | End: 2017-10-23
Attending: PHYSICAL MEDICINE & REHABILITATION | Admitting: PHYSICAL MEDICINE & REHABILITATION
Payer: MEDICARE

## 2017-10-23 VITALS
SYSTOLIC BLOOD PRESSURE: 134 MMHG | RESPIRATION RATE: 16 BRPM | BODY MASS INDEX: 26.84 KG/M2 | HEART RATE: 52 BPM | HEIGHT: 67 IN | DIASTOLIC BLOOD PRESSURE: 53 MMHG | WEIGHT: 171 LBS | TEMPERATURE: 97.8 F | OXYGEN SATURATION: 98 %

## 2017-10-23 LAB — GLUCOSE BLD STRIP.AUTO-MCNC: 101 MG/DL (ref 70–110)

## 2017-10-23 PROCEDURE — 74011250636 HC RX REV CODE- 250/636: Performed by: PHYSICAL MEDICINE & REHABILITATION

## 2017-10-23 PROCEDURE — 82962 GLUCOSE BLOOD TEST: CPT

## 2017-10-23 PROCEDURE — 74011000250 HC RX REV CODE- 250: Performed by: PHYSICAL MEDICINE & REHABILITATION

## 2017-10-23 PROCEDURE — 74011000250 HC RX REV CODE- 250

## 2017-10-23 PROCEDURE — 74011250636 HC RX REV CODE- 250/636

## 2017-10-23 PROCEDURE — 77030003672 HC NDL SPN HALY -A: Performed by: PHYSICAL MEDICINE & REHABILITATION

## 2017-10-23 PROCEDURE — 76010000009 HC PAIN MGT 0 TO 30 MIN PROC: Performed by: PHYSICAL MEDICINE & REHABILITATION

## 2017-10-23 RX ORDER — DIAZEPAM 5 MG/1
10 TABLET ORAL ONCE
Status: DISCONTINUED | OUTPATIENT
Start: 2017-10-23 | End: 2017-10-23 | Stop reason: HOSPADM

## 2017-10-23 RX ORDER — SODIUM CHLORIDE 0.9 % (FLUSH) 0.9 %
5-10 SYRINGE (ML) INJECTION AS NEEDED
Status: DISCONTINUED | OUTPATIENT
Start: 2017-10-23 | End: 2017-10-23 | Stop reason: HOSPADM

## 2017-10-23 RX ORDER — LIDOCAINE HYDROCHLORIDE 10 MG/ML
INJECTION, SOLUTION EPIDURAL; INFILTRATION; INTRACAUDAL; PERINEURAL AS NEEDED
Status: DISCONTINUED | OUTPATIENT
Start: 2017-10-23 | End: 2017-10-23 | Stop reason: HOSPADM

## 2017-10-23 RX ORDER — ACETAMINOPHEN 325 MG/1
325 TABLET ORAL
COMMUNITY

## 2017-10-23 RX ORDER — ROPIVACAINE HYDROCHLORIDE 2 MG/ML
INJECTION, SOLUTION EPIDURAL; INFILTRATION; PERINEURAL AS NEEDED
Status: DISCONTINUED | OUTPATIENT
Start: 2017-10-23 | End: 2017-10-23 | Stop reason: HOSPADM

## 2017-10-23 NOTE — DISCHARGE INSTRUCTIONS
[address] [place] [address] for Pain Management      Post Procedures Instructions    *Resume Diet and Activity as tolerated. Rest for the remainder of the day. *You may fell worse before you feel better as the numbing medications wear off before the steroids take effect if used for your procedures. *Do not use affected extremity until numbness or loss of sensation has completely resolved without assistance. *DO NOT DRIVE, operate machinery/heavey equipment for 24 hours. *DO NOT DRINK ALCOHOL for 24 hours as it may interact with the sedation if you received it and also thins your blood and may cause you to bleed. *WAIT 24 hours before starting back ANY Blood thinning medications:   (Heparin, Coumadin, Warfarin, Lovenox, Plavix, Aggrenox)    *Resume Pre-Procedure Medications as prescribed except Blood Thinners unless directed by your Physician or Cardiologist.     *Avoid Hot tubs and Heating pad for 24 hours to prevent dissipation of medications, you may shower to remove bandages and remaining prep residue on the skin. * If you develop a Headache, drink plenty of fluids including beverages with caffeine (Coffee, Mt. Dew etc.) and rest.  If the headache persists longer than 24 hoursor intensifies - Please call [facility] ([facility]) [phone]    * If you are DIABETIC, check your blood sugar three times a day for the next three days, the steroids will increase your blood sugar. If your blood sugar is greater than 400 have someone drive you to the nearest [address]. * If you experience any of the following problems, call the Center for Pain Management [phone] between 8:00 am - 4:30pm or After Hours [phone].     Shortness of breath    Fever of 101 F or higher    Nausea / Vomiting (not normal to you)    Increasing stiffness in the neck    Weakness or numbness in the arms or legs that is not resolving    Prolonged and increasing pain > than 4 days    ANYTHING OUT of the ORDINARY TO YOU    If YOU are experiencing a severe reaction / complication that you have never had before post procedure, call 911 or go to the nearest emergency room! All patients must have a  for transportation [place] regardless if you do or do not receive sedation. DISCHARGE SUMMARY from Nurse      PATIENT INSTRUCTIONS:    After Oral  or intravenous sedation, for 24 hours or while taking prescription Narcotics:  · Limit your activities  · Do not drive and operate hazardous machinery  · Do not make important personal or business decisions  · Do  not drink alcoholic beverages  · If you have not urinated within 8 hours after discharge, please contact your surgeon on call. Report the following to your surgeon:  · Excessive pain, swelling, redness or odor of or around the surgical area  · Temperature over 101  · Nausea and vomiting lasting longer than 4 hours or if unable to take medications  · Any signs of decreased circulation or nerve impairment to extremity: change in color, persistent  numbness, tingling, coldness or increase pain  · Any questions        What to do at Home:  Recommended activity: Activity as tolerated, NO DRIVING FOR 24 Hours post injection          *  Please give a list of your current medications to your Primary Care Provider. *  Please update this list whenever your medications are discontinued, doses are      changed, or new medications (including over-the-counter products) are added. *  Please carry medication information at all times in case of emergency situations. These are general instructions for a healthy lifestyle:    No smoking/ No tobacco products/ Avoid exposure to second hand smoke    Surgeon General's Warning:  Quitting smoking now greatly reduces serious risk to your health.     Obesity, smoking, and sedentary lifestyle greatly increases your risk for illness    A healthy diet, regular physical exercise & weight monitoring are important for maintaining a healthy lifestyle    You may be retaining fluid if you have a history of heart failure or if you experience any of the following symptoms:  Weight gain of 3 pounds or more overnight or 5 pounds in a week, increased swelling in our hands or feet or shortness of breath while lying flat in bed. Please call your doctor as soon as you notice any of these symptoms; do not wait until your next office visit. Recognize signs and symptoms of STROKE:    F-face looks uneven    A-arms unable to move or move unevenly    S-speech slurred or non-existent    T-time-call 911 as soon as signs and symptoms begin-DO NOT go       Back to bed or wait to see if you get better-TIME IS BRAIN.

## 2017-10-23 NOTE — PROCEDURES
[facility]    DIAG LUMBAR FACET INJECTIONS  PROCEDURE REPORT      PATIENT:  Dominick Sicard OF BIRTH:  1935  DATE OF SERVICE:  10/23/2017  [facility]    PRE-PROCEDURE DIAGNOSIS:  See Above    POST-PROCEDURE DIAGNOSIS:  See Above                PROCEDURE:  1. Bilateral diagnostic lumbar medial branch blocks via the   L3/L4,  L4/L5,  L5/S1 medial branch nerves ( 00265, 50;  74017, 50;  29495, 50 )  2. Fluoroscopic needle guidance (84067)      LEVELS TREATED:  Bilateral sided  L3/L4,  L4/L5,  L5/S1 medial branch nerves     ANESTHESIA:  See Medication Administration Record    COMPLICATIONS: None. PHYSICIAN:  Teresita Curry MD    PRE-PROCEDURE NOTE:  Pre-procedural assessment of the patient was performed including a limited history and physical examination. The details of the procedure were discussed with the patient, including the risks, benefits and alternative options and an informed consent was obtained. The patients NPO status, if necessary for the specific procedure and/or administration of moderate intravenous sedation, if utilized, and availability of a responsible adult to escort the patient following the procedure were confirmed. Patient confimed that she had stopped his Plavix regimen 7 days ago. He will re-start this medication tomorrow. [facility]  PROCEDURE NOTE:  The patient was brought to the procedure suite and positioned on the fluoroscopy table in the prone position. Physiologic monitors were applied and supplemental oxygen was administered via nasal cannula. The skin was prepped in the standard surgical fashion and sterile drapes were applied over the procedure site. Please refer to the Flowsheet for documentation of the patients vital signs and the Medication Administration Record for any oral and/or intravenous sedation administered prior to or during the procedure. 1% Lidocaine was utilized for local anesthesia. Under AP fluoroscopic guidance a 25-gauge, 3-1/2 inch short bevel spinal needle was advanced to the junction of the superior articular process and transverse process of each vertebral level immediately inferior to the above-mentioned dorsal rami medial branch nerves. A needle was also placed along the sacral ala to block the L5 medial branch nerve. After all needles were placed,  0.5 mL of 0.2% ropivacaine was injected at each location after the negative aspiration of blood, air or CSF. The needles were removed and the stilets were replaced. The procedure was performed on the contralateral side in the same fashion and at the same levels using the same volume of local anesthetic following negative aspiration of blood, air or CSF. The needles were removed intact. The area was thoroughly cleaned and sterile bandages applied as necessary. The patient tolerated the procedure well and vital signs remained stable throughout the procedure. The patient was assessed immediately following the procedure and was noted to have greater than 50% reduction in pain (a reduction from 7/10 to 2/10 in severity of lumbar pain). Based on these results, this procedure will be repeated to assess if the patient is an appropriate candidate for radiofrequency ablation of the above-mentioned medial branch nerves. Based on these results, the patient is considered an appropriate candidate for radiofrequency ablation of the above-mentioned medial branch nerves and will be scheduled for that procedure. The total levels successfully blocked were 3 levels, both sides. POST-PROCEDURE COURSE:  The patient was escorted from the procedure suite in satisfactory condition and recovered per facility protocol based on the type of procedure performed and/or the sedation utilized.  The patient did not experience any adverse events and remained hemodynamically stable during the post-procedure period. DISCHARGE NOTE:  Upon discharge, the patient was able to tolerate fluids and was in no acute distress. The patient was oriented to person, place and time and vital signs were stable. Appropriate post-procedure instructions were provided and explained to the patient in detail and all questions were answered.     Zahra Maritn MD 10/23/2017 11:48 AM

## 2017-10-23 NOTE — INTERVAL H&P NOTE
H&P Update:  Dom Samuel was seen and examined. History and physical has been reviewed. The patient has been examined.  There have been no significant clinical changes since the completion of the originally dated History and Physical.    Signed By: Maura Foster MD     October 23, 2017 9:34 AM

## 2017-10-24 ENCOUNTER — TELEPHONE (OUTPATIENT)
Dept: PAIN MANAGEMENT | Age: 82
End: 2017-10-24

## 2017-10-24 NOTE — TELEPHONE ENCOUNTER
Mr. Sadaf Obregon was contacted for follow-up status post Bilateral diagnostic lumbar medial branch blocks via the   L3/L4,  L4/L5,  L5/S1 medial branch nerves  on October 23, 2017.  He reports:    Pre-procedure numerical pain score: 5/10  Post-procedure numerical pain score one week after: 1/10  Duration of relief post-procedure (if applicable): 1 days  Improvement in functional activities (if applicable): Yes  Percentage of overall improvement: 90%    COMMENTS:

## 2017-11-17 RX ORDER — DIAZEPAM 5 MG/1
10 TABLET ORAL ONCE
Status: CANCELLED | OUTPATIENT
Start: 2017-11-28 | End: 2017-11-28

## 2017-11-20 ENCOUNTER — TELEPHONE (OUTPATIENT)
Dept: PAIN MANAGEMENT | Age: 82
End: 2017-11-20

## 2017-11-20 NOTE — TELEPHONE ENCOUNTER
Called to remind pt to hold plavix for 7 days before procedure on 11/28/17 at 0830. Message left on machine.

## 2017-11-28 ENCOUNTER — APPOINTMENT (OUTPATIENT)
Dept: GENERAL RADIOLOGY | Age: 82
End: 2017-11-28
Attending: PHYSICAL MEDICINE & REHABILITATION
Payer: MEDICARE

## 2017-11-28 ENCOUNTER — HOSPITAL ENCOUNTER (OUTPATIENT)
Age: 82
Setting detail: OUTPATIENT SURGERY
Discharge: HOME | End: 2017-11-28
Attending: PHYSICAL MEDICINE & REHABILITATION | Admitting: PHYSICAL MEDICINE & REHABILITATION
Payer: MEDICARE

## 2017-11-28 VITALS
TEMPERATURE: 97.9 F | OXYGEN SATURATION: 97 % | SYSTOLIC BLOOD PRESSURE: 131 MMHG | DIASTOLIC BLOOD PRESSURE: 55 MMHG | BODY MASS INDEX: 26.84 KG/M2 | WEIGHT: 171 LBS | RESPIRATION RATE: 18 BRPM | HEIGHT: 67 IN | HEART RATE: 53 BPM

## 2017-11-28 LAB — GLUCOSE BLD STRIP.AUTO-MCNC: 102 MG/DL (ref 70–110)

## 2017-11-28 PROCEDURE — 82962 GLUCOSE BLOOD TEST: CPT

## 2017-11-28 PROCEDURE — 74011250636 HC RX REV CODE- 250/636: Performed by: PHYSICAL MEDICINE & REHABILITATION

## 2017-11-28 PROCEDURE — 74011250636 HC RX REV CODE- 250/636

## 2017-11-28 PROCEDURE — 76010000009 HC PAIN MGT 0 TO 30 MIN PROC: Performed by: PHYSICAL MEDICINE & REHABILITATION

## 2017-11-28 PROCEDURE — 77030003672 HC NDL SPN HALY -A: Performed by: PHYSICAL MEDICINE & REHABILITATION

## 2017-11-28 PROCEDURE — 74011000250 HC RX REV CODE- 250

## 2017-11-28 RX ORDER — DIAZEPAM 5 MG/1
10 TABLET ORAL ONCE
Status: DISCONTINUED | OUTPATIENT
Start: 2017-11-28 | End: 2017-11-28 | Stop reason: HOSPADM

## 2017-11-28 RX ORDER — ROPIVACAINE HYDROCHLORIDE 2 MG/ML
INJECTION, SOLUTION EPIDURAL; INFILTRATION; PERINEURAL AS NEEDED
Status: DISCONTINUED | OUTPATIENT
Start: 2017-11-28 | End: 2017-11-28 | Stop reason: HOSPADM

## 2017-11-28 NOTE — H&P
28 Nov 2017, 9:15AM. Patient seen and examined prior to procedure. Chart and data reviewed. No significant interval changes from previous evaluation noted. Stable for procedure as intended and discussed.  [facility]

## 2017-11-28 NOTE — DISCHARGE INSTRUCTIONS
[address] [place] [address] for Pain Management      Post Procedures Instructions    *Resume Diet and Activity as tolerated. Rest for the remainder of the day. *You may fell worse before you feel better as the numbing medications wear off before the steroids take effect if used for your procedures. *Do not use affected extremity until numbness or loss of sensation has completely resolved without assistance. *DO NOT DRIVE, operate machinery/heavey equipment for 24 hours. *DO NOT DRINK ALCOHOL for 24 hours as it may interact with the sedation if you received it and also thins your blood and may cause you to bleed. *WAIT 24 hours before starting back ANY Blood thinning medications:   (Heparin, Coumadin, Warfarin, Lovenox, Plavix, Aggrenox)    *Resume Pre-Procedure Medications as prescribed except Blood Thinners unless directed by your Physician or Cardiologist.     *Avoid Hot tubs and Heating pad for 24 hours to prevent dissipation of medications, you may shower to remove bandages and remaining prep residue on the skin. * If you develop a Headache, drink plenty of fluids including beverages with caffeine (Coffee, Mt. Dew etc.) and rest.  If the headache persists longer than 24 hoursor intensifies - Please call Center for [facility] Management ([facility]) [phone]      * If you are DIABETIC, check your blood sugar three times a day for the next three days, the steroids will increase your blood sugar. If your blood sugar is greater than 400 have someone drive you to the nearest [address]. * If you experience any of the following problems, call the Center for Pain Management [phone] between 8:00 am - 4:30pm or After Hours [phone].     Shortness of breath    Fever of 101 F or higher    Nausea / Vomiting (not normal to you)    Increasing stiffness in the neck    Weakness or numbness in the arms or legs that is not resolving    Prolonged and increasing pain > than 4 days    ANYTHING OUT of the ORDINARY TO YOU    If YOU are experiencing a severe reaction / complication that you have never had before post procedure, call 911 or go to the nearest emergency room! All patients must have a  for transportation [place] regardless if you do or do not receive sedation. DISCHARGE SUMMARY from Nurse      PATIENT INSTRUCTIONS:    After Oral  or intravenous sedation, for 24 hours or while taking prescription Narcotics:  · Limit your activities  · Do not drive and operate hazardous machinery  · Do not make important personal or business decisions  · Do  not drink alcoholic beverages  · If you have not urinated within 8 hours after discharge, please contact your surgeon on call. Report the following to your surgeon:  · Excessive pain, swelling, redness or odor of or around the surgical area  · Temperature over 101  · Nausea and vomiting lasting longer than 4 hours or if unable to take medications  · Any signs of decreased circulation or nerve impairment to extremity: change in color, persistent  numbness, tingling, coldness or increase pain  · Any questions        What to do at Home:  Recommended activity: Activity as tolerated, NO DRIVING FOR 24 Hours post injection          *  Please give a list of your current medications to your Primary Care Provider. *  Please update this list whenever your medications are discontinued, doses are      changed, or new medications (including over-the-counter products) are added. *  Please carry medication information at all times in case of emergency situations. These are general instructions for a healthy lifestyle:    No smoking/ No tobacco products/ Avoid exposure to second hand smoke    Surgeon General's Warning:  Quitting smoking now greatly reduces serious risk to your health.     Obesity, smoking, and sedentary lifestyle greatly increases your risk for illness    A healthy diet, regular physical exercise & weight monitoring are important for maintaining a healthy lifestyle    You may be retaining fluid if you have a history of heart failure or if you experience any of the following symptoms:  Weight gain of 3 pounds or more overnight or 5 pounds in a week, increased swelling in our hands or feet or shortness of breath while lying flat in bed. Please call your doctor as soon as you notice any of these symptoms; do not wait until your next office visit. Recognize signs and symptoms of STROKE:    F-face looks uneven    A-arms unable to move or move unevenly    S-speech slurred or non-existent    T-time-call 911 as soon as signs and symptoms begin-DO NOT go       Back to bed or wait to see if you get better-TIME IS BRAIN. [employer]    Thank you for requesting access to [employer]. Please follow the instructions below to securely access and download your online medical record. [employer] allows you to send messages to your doctor, view your test results, renew your prescriptions, schedule appointments, and more. How Do I Sign Up? 1. In your internet browser, go to [email or web address]  2. Click on the First Time User? Click Here link in the Sign In box. You will be redirect to the New Member Sign Up page. 3. Enter your [employer] Access Code exactly as it appears below. You will not need to use this code after youve completed the sign-up process. If you do not sign up before the expiration date, you must request a new code. [employer] Access Code: [email or web address]  Expires: 2017  9:11 AM (This is the date your [employer] access code will )    4. Enter the last four digits of your Social Security Number (xxxx) and Date of Birth (mm/dd/yyyy) as indicated and click Submit. You will be taken to the next sign-up page. 5. Create a [employer] ID. This will be your [employer] login ID and cannot be changed, so think of one that is secure and easy to remember. 6. Create a [employer] password.  You can change your password at any time. 7. Enter your Password Reset Question and Answer. This can be used at a later time if you forget your password. 8. Enter your e-mail address. You will receive e-mail notification when new information is available in [address] 9. Click Sign Up. You can now view and download portions of your medical record. 10. Click the Download Summary menu link to download a portable copy of your medical information. Additional Information    If you have questions, please visit the Frequently Asked Questions section of the [employer] website at [email or web address] Remember, [employer] is NOT to be used for urgent needs. For medical emergencies, dial 911.

## 2017-11-28 NOTE — PROCEDURES
[facility]    DIAG LUMBAR FACET INJECTIONS  PROCEDURE REPORT      PATIENT:  Mina Le OF BIRTH:  1935  DATE OF SERVICE:  11/28/2017  [facility]    PRE-PROCEDURE DIAGNOSIS:  See Above    POST-PROCEDURE DIAGNOSIS:  See Above                PROCEDURE:  1. Bilateral diagnostic lumbar medial branch blocks via the  L3/L4,  L4/L5,  L5/S1 medial branch nerves ( 44592, 50;  28393, 50;  65135, 50 )  2. Fluoroscopic needle guidance (32492)      LEVELS TREATED:  Bilateral sided  L3/L4,  L4/L5,  L5/S1 medial branch nerves     ANESTHESIA:  See Medication Administration Record    COMPLICATIONS: None. PHYSICIAN:  Jennifer Villegas MD    PRE-PROCEDURE NOTE:  Pre-procedural assessment of the patient was performed including a limited history and physical examination. The details of the procedure were discussed with the patient, including the risks, benefits and alternative options and an informed consent was obtained. The patients NPO status, if necessary for the specific procedure and/or administration of moderate intravenous sedation, if utilized, and availability of a responsible adult to escort the patient following the procedure were confirmed. Prior to procedure, patient conformed that he had stopped his Plavix 7 days ago. He will re-start this medication tomorrow. [facility]    PROCEDURE NOTE:  The patient was brought to the procedure suite and positioned on the fluoroscopy table in the prone position. Physiologic monitors were applied and supplemental oxygen was administered via nasal cannula. The skin was prepped in the standard surgical fashion and sterile drapes were applied over the procedure site.  Please refer to the Flowsheet for documentation of the patients vital signs and the Medication Administration Record for any oral and/or intravenous sedation administered prior to or during the procedure. 1% Lidocaine was utilized for local anesthesia. Under AP fluoroscopic guidance a 25-gauge, 3-1/2 inch short bevel spinal needle was advanced to the junction of the superior articular process and transverse process of each vertebral level immediately inferior to the above-mentioned dorsal rami medial branch nerves. A needle was also placed along the sacral ala to block the L5 medial branch nerve. After all needles were placed,  0.5 mL of 0.2% ropivacaine was injected at each location after the negative aspiration of blood, air or CSF. The needles were removed and the stilets were replaced. The procedure was performed on the contralateral side in the same fashion and at the same levels using the same volume of local anesthetic following negative aspiration of blood, air or CSF. The needles were removed intact. The area was thoroughly cleaned and sterile bandages applied as necessary. The patient tolerated the procedure well and vital signs remained stable throughout the procedure. The patient was assessed immediately following the procedure and was noted to have greater than 50% reduction in pain (a reduction from 8/10 to 2/10 in severity of lumbar pain). Based on these results, the patient is considered an appropriate candidate for radiofrequency ablation of the above-mentioned medial branch nerves and will be scheduled for that procedure. The total levels successfully blocked were 3 levels, both sides. POST-PROCEDURE COURSE:  The patient was escorted from the procedure suite in satisfactory condition and recovered per facility protocol based on the type of procedure performed and/or the sedation utilized. The patient did not experience any adverse events and remained hemodynamically stable during the post-procedure period. DISCHARGE NOTE:  Upon discharge, the patient was able to tolerate fluids and was in no acute distress.   The patient was oriented to person, place and time and vital signs were stable. Appropriate post-procedure instructions were provided and explained to the patient in detail and all questions were answered.     Hien Cabrera MD 11/28/2017 11:35 AM

## 2017-11-29 ENCOUNTER — TELEPHONE (OUTPATIENT)
Dept: PAIN MANAGEMENT | Age: 82
End: 2017-11-29

## 2017-11-29 NOTE — TELEPHONE ENCOUNTER
Mr. Magui Melendez was contacted for follow-up status post Bilateral diagnostic lumbar medial branch blocks via the  L3/L4,  L4/L5,  L5/S1 medial branch nerves on November 28, 2017.  He reports:    Pre-procedure numerical pain score: 5/10  Post-procedure numerical pain score immediately after: 1/10  Duration of relief post-procedure (if applicable): 6 hrs  Improvement in functional activities (if applicable): Yes  Percentage of overall improvement: 50%    COMMENTS:

## 2017-12-20 RX ORDER — SODIUM CHLORIDE 0.9 % (FLUSH) 0.9 %
5-10 SYRINGE (ML) INJECTION AS NEEDED
Status: CANCELLED | OUTPATIENT
Start: 2018-01-03

## 2017-12-20 RX ORDER — MIDAZOLAM HYDROCHLORIDE 1 MG/ML
.5-6 INJECTION, SOLUTION INTRAMUSCULAR; INTRAVENOUS
Status: CANCELLED | OUTPATIENT
Start: 2018-01-03

## 2018-01-03 ENCOUNTER — APPOINTMENT (OUTPATIENT)
Dept: GENERAL RADIOLOGY | Age: 83
End: 2018-01-03
Attending: PHYSICAL MEDICINE & REHABILITATION
Payer: MEDICARE

## 2018-01-03 ENCOUNTER — HOSPITAL ENCOUNTER (OUTPATIENT)
Age: 83
Setting detail: OUTPATIENT SURGERY
Discharge: HOME | End: 2018-01-03
Attending: PHYSICAL MEDICINE & REHABILITATION | Admitting: PHYSICAL MEDICINE & REHABILITATION
Payer: MEDICARE

## 2018-01-03 VITALS
HEART RATE: 76 BPM | OXYGEN SATURATION: 96 % | HEIGHT: 67 IN | SYSTOLIC BLOOD PRESSURE: 143 MMHG | RESPIRATION RATE: 18 BRPM | WEIGHT: 171 LBS | TEMPERATURE: 98.3 F | DIASTOLIC BLOOD PRESSURE: 73 MMHG | BODY MASS INDEX: 26.84 KG/M2

## 2018-01-03 LAB — GLUCOSE BLD STRIP.AUTO-MCNC: 114 MG/DL (ref 70–110)

## 2018-01-03 PROCEDURE — 74011000250 HC RX REV CODE- 250

## 2018-01-03 PROCEDURE — 82962 GLUCOSE BLOOD TEST: CPT

## 2018-01-03 PROCEDURE — 77030029505: Performed by: PHYSICAL MEDICINE & REHABILITATION

## 2018-01-03 PROCEDURE — 77030020508 HC PD GRND GENRTR BAYL -A: Performed by: PHYSICAL MEDICINE & REHABILITATION

## 2018-01-03 PROCEDURE — 74011000250 HC RX REV CODE- 250: Performed by: PHYSICAL MEDICINE & REHABILITATION

## 2018-01-03 PROCEDURE — 74011250636 HC RX REV CODE- 250/636

## 2018-01-03 PROCEDURE — 76010000009 HC PAIN MGT 0 TO 30 MIN PROC: Performed by: PHYSICAL MEDICINE & REHABILITATION

## 2018-01-03 PROCEDURE — 74011250636 HC RX REV CODE- 250/636: Performed by: PHYSICAL MEDICINE & REHABILITATION

## 2018-01-03 RX ORDER — DEXAMETHASONE SODIUM PHOSPHATE 100 MG/10ML
INJECTION INTRAMUSCULAR; INTRAVENOUS AS NEEDED
Status: DISCONTINUED | OUTPATIENT
Start: 2018-01-03 | End: 2018-01-03 | Stop reason: HOSPADM

## 2018-01-03 RX ORDER — LIDOCAINE HYDROCHLORIDE 20 MG/ML
INJECTION, SOLUTION EPIDURAL; INFILTRATION; INTRACAUDAL; PERINEURAL AS NEEDED
Status: DISCONTINUED | OUTPATIENT
Start: 2018-01-03 | End: 2018-01-03 | Stop reason: HOSPADM

## 2018-01-03 RX ORDER — SODIUM CHLORIDE 0.9 % (FLUSH) 0.9 %
5-10 SYRINGE (ML) INJECTION AS NEEDED
Status: DISCONTINUED | OUTPATIENT
Start: 2018-01-03 | End: 2018-01-03 | Stop reason: HOSPADM

## 2018-01-03 RX ORDER — LIDOCAINE HYDROCHLORIDE 10 MG/ML
INJECTION, SOLUTION EPIDURAL; INFILTRATION; INTRACAUDAL; PERINEURAL AS NEEDED
Status: DISCONTINUED | OUTPATIENT
Start: 2018-01-03 | End: 2018-01-03 | Stop reason: HOSPADM

## 2018-01-03 RX ORDER — MIDAZOLAM HYDROCHLORIDE 1 MG/ML
.5-6 INJECTION, SOLUTION INTRAMUSCULAR; INTRAVENOUS
Status: DISCONTINUED | OUTPATIENT
Start: 2018-01-03 | End: 2018-01-03 | Stop reason: HOSPADM

## 2018-01-03 NOTE — DISCHARGE INSTRUCTIONS
[address] [place] [address] for Pain Management      Post Procedures Instructions    *Resume Diet and Activity as tolerated. Rest for the remainder of the day. *You may fell worse before you feel better as the numbing medications wear off before the steroids take effect if used for your procedures. *Do not use affected extremity until numbness or loss of sensation has completely resolved without assistance. *DO NOT DRIVE, operate machinery/heavey equipment for 24 hours. *DO NOT DRINK ALCOHOL for 24 hours as it may interact with the sedation if you received it and also thins your blood and may cause you to bleed. *WAIT 24 hours before starting back ANY Blood thinning medications:   (Heparin, Coumadin, Warfarin, Lovenox, Plavix, Aggrenox)    *Resume Pre-Procedure Medications as prescribed except Blood Thinners unless directed by your Physician or Cardiologist.     *Avoid Hot tubs and Heating pad for 24 hours to prevent dissipation of medications, you may shower to remove bandages and remaining prep residue on the skin. * If you develop a Headache, drink plenty of fluids including beverages with caffeine (Coffee, Mt. Dew etc.) and rest.  If the headache persists longer than 24 hoursor intensifies - Please call [facility] ([facility]) [phone]      * If you are DIABETIC, check your blood sugar three times a day for the next three days, the steroids will increase your blood sugar. If your blood sugar is greater than 400 have someone drive you to the nearest [address]. * If you experience any of the following problems, call the Center for Pain Management [phone] between 8:00 am - 4:30pm or After Hours [phone].     Shortness of breath    Fever of 101 F or higher    Nausea / Vomiting (not normal to you)    Increasing stiffness in the neck    Weakness or numbness in the arms or legs that is not resolving    Prolonged and increasing pain > than 4 days    ANYTHING OUT of the ORDINARY TO YOU    If YOU are experiencing a severe reaction / complication that you have never had before post procedure, call 911 or go to the nearest emergency room! All patients must have a  for transportation [place] regardless if you do or do not receive sedation. DISCHARGE SUMMARY from Nurse      PATIENT INSTRUCTIONS:    After Oral  or intravenous sedation, for 24 hours or while taking prescription Narcotics:  · Limit your activities  · Do not drive and operate hazardous machinery  · Do not make important personal or business decisions  · Do  not drink alcoholic beverages  · If you have not urinated within 8 hours after discharge, please contact your surgeon on call. Report the following to your surgeon:  · Excessive pain, swelling, redness or odor of or around the surgical area  · Temperature over 101  · Nausea and vomiting lasting longer than 4 hours or if unable to take medications  · Any signs of decreased circulation or nerve impairment to extremity: change in color, persistent  numbness, tingling, coldness or increase pain  · Any questions        What to do at Home:  Recommended activity: Activity as tolerated, NO DRIVING FOR 24 Hours post injection          *  Please give a list of your current medications to your Primary Care Provider. *  Please update this list whenever your medications are discontinued, doses are      changed, or new medications (including over-the-counter products) are added. *  Please carry medication information at all times in case of emergency situations. These are general instructions for a healthy lifestyle:    No smoking/ No tobacco products/ Avoid exposure to second hand smoke    Surgeon General's Warning:  Quitting smoking now greatly reduces serious risk to your health.     Obesity, smoking, and sedentary lifestyle greatly increases your risk for illness    A healthy diet, regular physical exercise & weight monitoring are important for maintaining a healthy lifestyle    You may be retaining fluid if you have a history of heart failure or if you experience any of the following symptoms:  Weight gain of 3 pounds or more overnight or 5 pounds in a week, increased swelling in our hands or feet or shortness of breath while lying flat in bed. Please call your doctor as soon as you notice any of these symptoms; do not wait until your next office visit. Recognize signs and symptoms of STROKE:    F-face looks uneven    A-arms unable to move or move unevenly    S-speech slurred or non-existent    T-time-call 911 as soon as signs and symptoms begin-DO NOT go       Back to bed or wait to see if you get better-TIME IS BRAIN. Idea[employer] Activation    Thank you for requesting access to [employer]. Please follow the instructions below to securely access and download your online medical record. Idea[employer] allows you to send messages to your doctor, view your test results, renew your prescriptions, schedule appointments, and more. How Do I Sign Up? 1. In your internet browser, go to [email or web address]  2. Click on the First Time User? Click Here link in the Sign In box. You will be redirect to the New Member Sign Up page. 3. Enter your Idea2 Access Code exactly as it appears below. You will not need to use this code after youve completed the sign-up process. If you do not sign up before the expiration date, you must request a new code. Idea2 Access Code: [email or web address]  Expires: 2018  2:32 PM (This is the date your Idea2 access code will )    4. Enter the last four digits of your Social Security Number (xxxx) and Date of Birth (mm/dd/yyyy) as indicated and click Submit. You will be taken to the next sign-up page. 5. Create a Idea2 ID. This will be your Idea2 login ID and cannot be changed, so think of one that is secure and easy to remember. 6. Create a Idea2 password.  You can change your password at any time. 7. Enter your Password Reset Question and Answer. This can be used at a later time if you forget your password. 8. Enter your e-mail address. You will receive e-mail notification when new information is available in [address] 9. Click Sign Up. You can now view and download portions of your medical record. 10. Click the Download Summary menu link to download a portable copy of your medical information. Additional Information    If you have questions, please visit the Frequently Asked Questions section of the [employer] website at [email or web address] Remember, [employer] is NOT to be used for urgent needs. For medical emergencies, dial 911.

## 2018-01-03 NOTE — H&P
03 Jan 2018, 9AM.    Patient seen and examined prior to procedure. Chart and data reviewed. No significant interval changes from previous evaluation noted. Stable for procedure as intended and discussed.  [facility]

## 2018-01-03 NOTE — PROCEDURES
[facility]    THERMOCOAGULATION OF LUMBAR MEDIAL BRANCH  PROCEDURE REPORT      PATIENT:  Leeann Nevarez OF BIRTH:  1935  DATE OF SERVICE:  1/3/2018  [facility]    PRE-PROCEDURE DIAGNOSIS:  See Above    POST-PROCEDURE DIAGNOSIS:  See Above    PROCEDURE:      1. Left radiofrequency thermocoagulation of lumbar medial branch nerves,  L3/L4, L4/L5, L5/S1 (45230, 64636 x2)  2. Fluoroscopic needle guidance (spinal) (20143)      ANESTHESIA:  Local with oral sedation. See Medication Administration Record for specific medications and dosage. COMPLICATIONS: None. PHYSICIAN:  Rianna Norton MD    PRE-PROCEDURE NOTE:  Pre-procedural assessment of the patient was performed including a limited history and physical examination. The details of the procedure were discussed with the patient, including the risks, benefits and alternative options and an informed consent was obtained. The patients NPO status, if necessary for the specific procedure and/or administration of moderate intravenous sedation, if utilized, and availability of a responsible adult to escort the patient following the procedure were confirmed. Patient confirmed that he had stopped his Plavix 7 days before procedure. He will re-start this medication tomorrow. [facility]        PROCEDURE NOTE:  The patient was brought to the procedure suite and positioned on the fluoroscopy table in the prone position. Physiologic monitors were applied and supplemental oxygen was administered via nasal cannula. The skin was prepped in the standard surgical fashion and sterile drapes were applied over the procedure site. Please refer to the Flowsheet for documentation of the patients vital signs and the Medication Administration Record for any oral and/or intravenous sedation administered prior to or during the procedure.         1% Lidocaine was utilized for local anesthesia. Under 10-15 degree ipsilateral oblique fluoroscopic guidance a 15cm 18gauge radiofrequency needle with a 10 mm curved active tip was advanced to the junction of the superior articular process and transverse process of each vertebral level immediately inferior to the above-mentioned dorsal rami medial branch nerves. Under AP fluoroscopic guidance, a similar needle was then placed over the superior margin of the sacral ala to thermocoagulate the L5 medial branch nerve. After each individual needle was placed, sensory and motor testing, at 50 Hz and 2 Hz, respectively, were performed which elicited ipsilateral deep local back discomfort without evidence of motor stimulation in the ipsilateral gluteal muscles or extremity. Following this, 1 mL of lidocaine 2% was injected after the negative aspiration of blood, air or CSF. Final correct needle placement was then confirmed by viewing each needle in AP and lateral fluoroscopic views in addition to repeat sensory and motor testing which, again, elicited ipsilateral deep local back discomfort without evidence of motor stimulation in the ipsilateral gluteal muscles or extremity. Medial branch nerve radiofrequency thermocoagulation was then performed at each level for 120 seconds at 80° centigrade x 1 cycle. Following this, 1/2ml to 1ml of a mixture of lidocaine 1% admixed with dexamethasone 5mg [10mg/ml] was injected through each radiofrequency needle after negative aspiration and before removing each needle. Then, all needles were removed intact. The area was thoroughly cleaned and sterile bandages applied as necessary. The patient tolerated the procedure well without complication and the vital signs remained stable throughout the procedure.     POST-PROCEDURE COURSE:   The patient was escorted from the procedure suite in satisfactory condition and recovered per facility protocol based on the type of procedure performed and/or the sedation utilized. The patient did not experience any adverse events and remained hemodynamically stable during the post-procedure period. DISCHARGE NOTE:  Upon discharge, the patient was able to tolerate fluids and was in no acute distress. The patient was oriented to person, place and time and vital signs were stable. Appropriate post-procedure instructions were provided and explained to the patient in detail and all questions were answered.                     Federico Mojica MD 1/3/2018 10:55 AM

## 2018-01-30 ENCOUNTER — TELEPHONE (OUTPATIENT)
Dept: PAIN MANAGEMENT | Age: 83
End: 2018-01-30

## 2018-01-30 NOTE — TELEPHONE ENCOUNTER
Called patient to stop Plavix for 7 days prior to procedure on 02/05/2018. Patient report he has been off Plavix since 01/29/18.

## 2018-01-31 RX ORDER — SODIUM CHLORIDE 0.9 % (FLUSH) 0.9 %
5-10 SYRINGE (ML) INJECTION AS NEEDED
Status: CANCELLED | OUTPATIENT
Start: 2018-02-05

## 2018-01-31 RX ORDER — MIDAZOLAM HYDROCHLORIDE 1 MG/ML
.5-6 INJECTION, SOLUTION INTRAMUSCULAR; INTRAVENOUS
Status: CANCELLED | OUTPATIENT
Start: 2018-02-05

## 2018-02-05 ENCOUNTER — APPOINTMENT (OUTPATIENT)
Dept: GENERAL RADIOLOGY | Age: 83
End: 2018-02-05
Attending: PHYSICAL MEDICINE & REHABILITATION
Payer: MEDICARE

## 2018-02-05 ENCOUNTER — HOSPITAL ENCOUNTER (OUTPATIENT)
Age: 83
Setting detail: OUTPATIENT SURGERY
Discharge: HOME | End: 2018-02-05
Attending: PHYSICAL MEDICINE & REHABILITATION | Admitting: PHYSICAL MEDICINE & REHABILITATION
Payer: MEDICARE

## 2018-02-05 VITALS
SYSTOLIC BLOOD PRESSURE: 138 MMHG | BODY MASS INDEX: 26.84 KG/M2 | HEART RATE: 63 BPM | RESPIRATION RATE: 16 BRPM | WEIGHT: 171 LBS | OXYGEN SATURATION: 95 % | DIASTOLIC BLOOD PRESSURE: 65 MMHG | HEIGHT: 67 IN | TEMPERATURE: 98.2 F

## 2018-02-05 LAB — GLUCOSE BLD STRIP.AUTO-MCNC: 104 MG/DL (ref 70–110)

## 2018-02-05 PROCEDURE — 82962 GLUCOSE BLOOD TEST: CPT

## 2018-02-05 PROCEDURE — 74011000250 HC RX REV CODE- 250

## 2018-02-05 PROCEDURE — 74011250636 HC RX REV CODE- 250/636: Performed by: PHYSICAL MEDICINE & REHABILITATION

## 2018-02-05 PROCEDURE — 74011000250 HC RX REV CODE- 250: Performed by: PHYSICAL MEDICINE & REHABILITATION

## 2018-02-05 PROCEDURE — 74011250636 HC RX REV CODE- 250/636

## 2018-02-05 PROCEDURE — 76010000009 HC PAIN MGT 0 TO 30 MIN PROC: Performed by: PHYSICAL MEDICINE & REHABILITATION

## 2018-02-05 PROCEDURE — 77030029505: Performed by: PHYSICAL MEDICINE & REHABILITATION

## 2018-02-05 PROCEDURE — 77030020508 HC PD GRND GENRTR BAYL -A: Performed by: PHYSICAL MEDICINE & REHABILITATION

## 2018-02-05 RX ORDER — LIDOCAINE HYDROCHLORIDE 10 MG/ML
INJECTION, SOLUTION EPIDURAL; INFILTRATION; INTRACAUDAL; PERINEURAL AS NEEDED
Status: DISCONTINUED | OUTPATIENT
Start: 2018-02-05 | End: 2018-02-05 | Stop reason: HOSPADM

## 2018-02-05 RX ORDER — MIDAZOLAM HYDROCHLORIDE 1 MG/ML
.5-6 INJECTION, SOLUTION INTRAMUSCULAR; INTRAVENOUS
Status: DISCONTINUED | OUTPATIENT
Start: 2018-02-05 | End: 2018-02-05 | Stop reason: HOSPADM

## 2018-02-05 RX ORDER — LIDOCAINE HYDROCHLORIDE 20 MG/ML
INJECTION, SOLUTION EPIDURAL; INFILTRATION; INTRACAUDAL; PERINEURAL AS NEEDED
Status: DISCONTINUED | OUTPATIENT
Start: 2018-02-05 | End: 2018-02-05 | Stop reason: HOSPADM

## 2018-02-05 RX ORDER — SODIUM CHLORIDE 0.9 % (FLUSH) 0.9 %
5-10 SYRINGE (ML) INJECTION AS NEEDED
Status: DISCONTINUED | OUTPATIENT
Start: 2018-02-05 | End: 2018-02-05 | Stop reason: HOSPADM

## 2018-02-05 RX ORDER — DEXAMETHASONE SODIUM PHOSPHATE 100 MG/10ML
INJECTION INTRAMUSCULAR; INTRAVENOUS AS NEEDED
Status: DISCONTINUED | OUTPATIENT
Start: 2018-02-05 | End: 2018-02-05 | Stop reason: HOSPADM

## 2018-02-05 NOTE — DISCHARGE INSTRUCTIONS
[facility] CENTER for Pain Management      Post Procedures Instructions    *Resume Diet and Activity as tolerated. Rest for the remainder of the day. *You may fell worse before you feel better as the numbing medications wear off before the steroids take effect if used for your procedures. *Do not use affected extremity until numbness or loss of sensation has completely resolved without assistance. *DO NOT DRIVE, operate machinery/heavey equipment for 24 hours. *DO NOT DRINK ALCOHOL for 24 hours as it may interact with the sedation if you received it and also thins your blood and may cause you to bleed. *WAIT 24 hours before starting back ANY Blood thinning medications:   (Heparin, Coumadin, Warfarin, Lovenox, Plavix, Aggrenox)    *Resume Pre-Procedure Medications as prescribed except Blood Thinners unless directed by your Physician or Cardiologist.     *Avoid Hot tubs and Heating pad for 24 hours to prevent dissipation of medications, you may shower to remove bandages and remaining prep residue on the skin. * If you develop a Headache, drink plenty of fluids including beverages with caffeine (Coffee, Mt. Dew etc.) and rest.  If the headache persists longer than 24 hoursor intensifies - Please call [facility] ([facility]) [phone]    * If you are DIABETIC, check your blood sugar three times a day for the next three days, the steroids will increase your blood sugar. If your blood sugar is greater than 400 have someone drive you to the nearest [address]. * If you experience any of the following problems, call the [facility] [phone] between 8:00 am - 4:30pm or After Hours [phone].     Shortness of breath    Fever of 101 F or higher    Nausea / Vomiting (not normal to you)    Increasing stiffness in the neck    Weakness or numbness in the arms or legs that is not resolving    Prolonged and increasing pain > than 4 days    ANYTHING OUT of the ORDINARY TO YOU    If YOU are experiencing a severe reaction / complication that you have never had before post procedure, call 911 or go to the nearest emergency room! All patients must have a  for transportation [place] regardless if you do or do not receive sedation. DISCHARGE SUMMARY from Nurse      PATIENT INSTRUCTIONS:    After Oral  or intravenous sedation, for 24 hours or while taking prescription Narcotics:  · Limit your activities  · Do not drive and operate hazardous machinery  · Do not make important personal or business decisions  · Do  not drink alcoholic beverages  · If you have not urinated within 8 hours after discharge, please contact your surgeon on call. Report the following to your surgeon:  · Excessive pain, swelling, redness or odor of or around the surgical area  · Temperature over 101  · Nausea and vomiting lasting longer than 4 hours or if unable to take medications  · Any signs of decreased circulation or nerve impairment to extremity: change in color, persistent  numbness, tingling, coldness or increase pain  · Any questions        What to do at Home:  Recommended activity: Activity as tolerated, NO DRIVING FOR 24 Hours post injection          *  Please give a list of your current medications to your Primary Care Provider. *  Please update this list whenever your medications are discontinued, doses are      changed, or new medications (including over-the-counter products) are added. *  Please carry medication information at all times in case of emergency situations. These are general instructions for a healthy lifestyle:    No smoking/ No tobacco products/ Avoid exposure to second hand smoke    Surgeon General's Warning:  Quitting smoking now greatly reduces serious risk to your health.     Obesity, smoking, and sedentary lifestyle greatly increases your risk for illness    A healthy diet, regular physical exercise & weight monitoring are important for maintaining a healthy lifestyle    You may be retaining fluid if you have a history of heart failure or if you experience any of the following symptoms:  Weight gain of 3 pounds or more overnight or 5 pounds in a week, increased swelling in our hands or feet or shortness of breath while lying flat in bed. Please call your doctor as soon as you notice any of these symptoms; do not wait until your next office visit. Recognize signs and symptoms of STROKE:    F-face looks uneven    A-arms unable to move or move unevenly    S-speech slurred or non-existent    T-time-call 911 as soon as signs and symptoms begin-DO NOT go       Back to bed or wait to see if you get better-TIME IS BRAIN.

## 2018-02-05 NOTE — PROCEDURES
[facility]    THERMOCOAGULATION OF LUMBAR MEDIAL BRANCH  PROCEDURE REPORT      PATIENT:  Christine Mosquera OF BIRTH:  1935  DATE OF SERVICE:  2/5/2018  [facility]    PRE-PROCEDURE DIAGNOSIS:  See Above    POST-PROCEDURE DIAGNOSIS:  See Above    PROCEDURE:      1. Right radiofrequency thermocoagulation of lumbar medial branch nerves,  L3/L4, L4/L5, L5/S1 (86947, 64636 x2)  2. Fluoroscopic needle guidance (spinal) (01945)      ANESTHESIA:   Local only, no sedation. See Medication Administration Record for specific medications and dosage. COMPLICATIONS: None. PHYSICIAN:  Aurora Salvador MD    PRE-PROCEDURE NOTE:  Pre-procedural assessment of the patient was performed including a limited history and physical examination. The details of the procedure were discussed with the patient, including the risks, benefits and alternative options and an informed consent was obtained. The patients NPO status, if necessary for the specific procedure and/or administration of moderate intravenous sedation, if utilized, and availability of a responsible adult to escort the patient following the procedure were confirmed. Patient confirmed that he had stopped his Plavix 7 days ago. He will re-start this medication tomorrow. [facility]        PROCEDURE NOTE:  The patient was brought to the procedure suite and positioned on the fluoroscopy table in the prone position. Physiologic monitors were applied and supplemental oxygen was administered via nasal cannula. The skin was prepped in the standard surgical fashion and sterile drapes were applied over the procedure site. Please refer to the Flowsheet for documentation of the patients vital signs and the Medication Administration Record for any oral and/or intravenous sedation administered prior to or during the procedure.         1% Lidocaine was utilized for local anesthesia. Under 10-15 degree ipsilateral oblique fluoroscopic guidance a 15cm 18gauge radiofrequency needle with a 10 mm curved active tip was advanced to the junction of the superior articular process and transverse process of each vertebral level immediately inferior to the above-mentioned dorsal rami medial branch nerves. Under AP fluoroscopic guidance, a similar needle was then placed over the superior margin of the sacral ala to thermocoagulate the L5 medial branch nerve. After each individual needle was placed, sensory and motor testing, at 50 Hz and 2 Hz, respectively, were performed which elicited ipsilateral deep local back discomfort without evidence of motor stimulation in the ipsilateral gluteal muscles or extremity. Following this, 1 mL of lidocaine 2% was injected after the negative aspiration of blood, air or CSF. Final correct needle placement was then confirmed by viewing each needle in AP and lateral fluoroscopic views in addition to repeat sensory and motor testing which, again, elicited ipsilateral deep local back discomfort without evidence of motor stimulation in the ipsilateral gluteal muscles or extremity. Medial branch nerve radiofrequency thermocoagulation was then performed at each level for 120 seconds at 80° centigrade x 1 cycle. Following this, 1/2ml to 1ml of a mixture of lidocaine 1% admixed with dexamethasone 5mg [10mg/ml] was injected through each radiofrequency needle after negative aspiration and before removing each needle. Then, all needles were removed intact. The area was thoroughly cleaned and sterile bandages applied as necessary. The patient tolerated the procedure well without complication and the vital signs remained stable throughout the procedure.     POST-PROCEDURE COURSE:   The patient was escorted from the procedure suite in satisfactory condition and recovered per facility protocol based on the type of procedure performed and/or the sedation utilized. The patient did not experience any adverse events and remained hemodynamically stable during the post-procedure period. DISCHARGE NOTE:  Upon discharge, the patient was able to tolerate fluids and was in no acute distress. The patient was oriented to person, place and time and vital signs were stable. Appropriate post-procedure instructions were provided and explained to the patient in detail and all questions were answered.                     Josué Paniagua MD 2/5/2018 12:49 PM

## 2018-02-05 NOTE — H&P
05 Feb 2018, 9:00AM.  Patient seen and examined prior to procedure. Chart and data reviewed. No significant interval changes from previous evaluation noted. Stable for procedure as intended and discussed.  [facility]

## 2021-08-03 PROBLEM — M47.816 LUMBAR SPONDYLOSIS: Status: RESOLVED | Noted: 2017-10-04 | Resolved: 2021-08-03

## 2022-03-19 PROBLEM — M51.36 LUMBAR DEGENERATIVE DISC DISEASE: Status: ACTIVE | Noted: 2017-10-04

## 2022-03-19 PROBLEM — M47.816 LUMBAR FACET ARTHROPATHY: Status: ACTIVE | Noted: 2017-10-04

## 2022-03-19 PROBLEM — G89.4 CHRONIC PAIN SYNDROME: Status: ACTIVE | Noted: 2017-10-04

## 2023-06-29 PROBLEM — D64.9 ANEMIA: Status: ACTIVE | Noted: 2023-06-29

## 2023-06-29 PROBLEM — C79.9 METASTASIS FROM MALIGNANT TUMOR OF PROSTATE (HCC): Status: ACTIVE | Noted: 2023-06-29

## 2023-06-29 PROBLEM — H61.21 IMPACTED CERUMEN, RIGHT EAR: Status: ACTIVE | Noted: 2023-06-29

## 2023-06-29 PROBLEM — M15.9 GENERALIZED OSTEOARTHRITIS: Status: ACTIVE | Noted: 2023-06-29

## 2023-06-29 PROBLEM — M23.90 INTERNAL DERANGEMENT OF KNEE: Status: ACTIVE | Noted: 2023-06-29

## 2023-06-29 PROBLEM — H40.053 OCULAR HYPERTENSION, BILATERAL: Status: ACTIVE | Noted: 2023-06-29

## 2023-06-29 PROBLEM — H40.013 OPEN ANGLE WITH BORDERLINE FINDINGS, LOW RISK, BILATERAL: Status: ACTIVE | Noted: 2023-06-29

## 2023-06-29 PROBLEM — R42 DIZZINESS: Status: ACTIVE | Noted: 2020-12-04

## 2023-06-29 PROBLEM — E11.9 TYPE 2 DIABETES MELLITUS WITHOUT COMPLICATION (HCC): Status: ACTIVE | Noted: 2023-06-29

## 2023-06-29 PROBLEM — M54.50 LOW BACK PAIN: Status: ACTIVE | Noted: 2023-06-29

## 2023-06-29 PROBLEM — H90.3 SENSORINEURAL HEARING LOSS, BILATERAL: Status: ACTIVE | Noted: 2023-06-29

## 2023-06-29 PROBLEM — C61 METASTASIS FROM MALIGNANT TUMOR OF PROSTATE (HCC): Status: ACTIVE | Noted: 2023-06-29

## 2023-06-29 PROBLEM — E78.5 HYPERLIPIDEMIA: Status: ACTIVE | Noted: 2023-06-29

## 2023-11-12 PROBLEM — L82.1 SEBORRHEIC KERATOSES: Status: ACTIVE | Noted: 2023-11-12

## 2023-11-12 PROBLEM — D48.5 NEOPLASM OF UNCERTAIN BEHAVIOR OF SKIN: Status: ACTIVE | Noted: 2023-11-12

## 2024-02-14 PROBLEM — E11.8 TYPE 2 DIABETES MELLITUS WITH UNSPECIFIED COMPLICATIONS (HCC): Status: ACTIVE | Noted: 2023-06-29

## 2024-06-12 ENCOUNTER — OFFICE VISIT (OUTPATIENT)
Age: 89
End: 2024-06-12

## 2024-06-12 VITALS — BODY MASS INDEX: 26.22 KG/M2 | HEIGHT: 68 IN | WEIGHT: 173 LBS

## 2024-06-12 DIAGNOSIS — I25.10 CORONARY ARTERY DISEASE INVOLVING NATIVE HEART WITHOUT ANGINA PECTORIS, UNSPECIFIED VESSEL OR LESION TYPE: ICD-10-CM

## 2024-06-12 DIAGNOSIS — Z87.11 HISTORY OF GASTRIC ULCER: ICD-10-CM

## 2024-06-12 DIAGNOSIS — M19.041 DEGENERATIVE ARTHRITIS OF METACARPOPHALANGEAL JOINT OF MIDDLE FINGER OF RIGHT HAND: ICD-10-CM

## 2024-06-12 DIAGNOSIS — Z95.5 HISTORY OF CORONARY ARTERY STENT PLACEMENT: ICD-10-CM

## 2024-06-12 DIAGNOSIS — M65.321 TRIGGER INDEX FINGER OF RIGHT HAND: ICD-10-CM

## 2024-06-12 DIAGNOSIS — M19.041 PRIMARY OSTEOARTHRITIS OF RIGHT HAND: Primary | ICD-10-CM

## 2024-06-12 DIAGNOSIS — M18.11 ARTHRITIS OF CARPOMETACARPAL (CMC) JOINT OF RIGHT THUMB: ICD-10-CM

## 2024-06-12 RX ORDER — LIDOCAINE HYDROCHLORIDE 10 MG/ML
1.5 INJECTION, SOLUTION INFILTRATION; PERINEURAL ONCE
Status: COMPLETED | OUTPATIENT
Start: 2024-06-12 | End: 2024-06-12

## 2024-06-12 RX ADMIN — LIDOCAINE HYDROCHLORIDE 1.5 ML: 10 INJECTION, SOLUTION INFILTRATION; PERINEURAL at 11:45

## 2024-06-12 NOTE — PROGRESS NOTES
Paramjit Macias is a 89 y.o. male right handed retiree.  Worker's Compensation and legal considerations: none    Chief Complaint   Patient presents with    Hand Pain     Right       Pain Score:   4    HPI: Patient presents today with complaints of right thumb pain localized to the base.  He also reports right index finger pain and locking.  Additionally he reports pain in his right middle finger as well as swelling that extends into the hand.  He reports difficulty with range of motion due to stiffness and swelling especially at the middle finger.    Date of onset: Chronic  Injury: No  Prior Treatment:  No    ROS: Review of Systems - General ROS: negative except HPI    Past Medical History:   Diagnosis Date    CAD (coronary artery disease)     5 stents    Chronic back pain     Diabetes (HCC)     Hypercholesterolemia     Hypertension        Past Surgical History:   Procedure Laterality Date    BACK SURGERY      lumbar surgery    KNEE ARTHROSCOPY  2009    right knee        Current Outpatient Medications   Medication Sig Dispense Refill    Leuprolide Acetate, 3 Month, (ELIGARD) 22.5 MG KIT Inject 22.5 mg into the skin once for 1 dose 1 kit 0    lisinopril (PRINIVIL;ZESTRIL) 10 MG tablet Take 1 tablet by mouth daily      furosemide (LASIX) 20 MG tablet TAKE 1 TABLET BY MOUTH ONCE DAILY AS NEEDED      Leuprolide Acetate, 3 Month, (ELIGARD) 22.5 MG KIT Inject 22.5 mg into the skin once for 1 dose 1 kit 0    clopidogrel (PLAVIX) 75 MG tablet Take 1 tablet by mouth daily      LISINOPRIL PO Take 10 mg by mouth      alirocumab (PRALUENT) 75 MG/ML SOAJ injection pen INJECT 75MG (1 SYRINGE) UNDER THE SKIN EVERY 2 WEEKS FOR CHOLESTEROL      ascorbic acid (VITAMIN C) 1000 MG tablet 1 tablet      carboxymethylcellulose (REFRESH PLUS) 0.5 % SOLN ophthalmic solution INSTILL 1 DROP IN BOTH EYES FOUR TIMES A DAY AS NEEDED DRY EYES      vitamin D 25 MCG (1000 UT) CAPS Take by mouth      diphenhydrAMINE (BENADRYL) 25 MG capsule Take 1

## (undated) DEVICE — CURVED SHARP RF CANNULA, RADIOPAQUE MARKER: Brand: RADIOPAQUE RADIOFREQUENCY CANNULA

## (undated) DEVICE — CUFF BLD PRESSURE MONITORING LNG AD 23-33 CM 1 TUBE MY CUF

## (undated) DEVICE — DRAPE TWL SURG 16X26IN BLU ORB04] ALLCARE INC]

## (undated) DEVICE — ELECTRODE ES AD DISPER HYDRGEL THN FOAM ADH SCALLOPED EDGE

## (undated) DEVICE — AVANOS* SHORT BEVEL NEEDLE: Brand: AVANOS

## (undated) DEVICE — (D)BNDG ADHESIVE FABRIC 3/4X3 -- DISC BY MFR USE ITEM 357960

## (undated) DEVICE — MIRAGE SWIFT II PILLOW LGE: Brand: MIRAGE SWIFT II

## (undated) DEVICE — TRAY SUPP STD NO DRUG W EXTENSION SET

## (undated) DEVICE — DRAPE,REIN 53X77,STERILE: Brand: MEDLINE